# Patient Record
Sex: FEMALE | Race: WHITE | NOT HISPANIC OR LATINO | URBAN - METROPOLITAN AREA
[De-identification: names, ages, dates, MRNs, and addresses within clinical notes are randomized per-mention and may not be internally consistent; named-entity substitution may affect disease eponyms.]

---

## 2017-03-03 ENCOUNTER — EMERGENCY (EMERGENCY)
Facility: HOSPITAL | Age: 67
LOS: 1 days | Discharge: HOME | End: 2017-03-03
Attending: EMERGENCY MEDICINE | Admitting: EMERGENCY MEDICINE
Payer: MEDICARE

## 2017-03-03 VITALS
TEMPERATURE: 98 F | DIASTOLIC BLOOD PRESSURE: 82 MMHG | WEIGHT: 125 LBS | SYSTOLIC BLOOD PRESSURE: 131 MMHG | OXYGEN SATURATION: 98 % | HEART RATE: 100 BPM | RESPIRATION RATE: 14 BRPM | HEIGHT: 59 IN

## 2017-03-03 DIAGNOSIS — Z90.710 ACQUIRED ABSENCE OF BOTH CERVIX AND UTERUS: Chronic | ICD-10-CM

## 2017-03-03 DIAGNOSIS — M25.561 PAIN IN RIGHT KNEE: ICD-10-CM
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
## 2017-03-03 DIAGNOSIS — S82.001A UNSPECIFIED FRACTURE OF RIGHT PATELLA, INITIAL ENCOUNTER FOR CLOSED FRACTURE: ICD-10-CM

## 2017-03-03 PROCEDURE — 73564 X-RAY EXAM KNEE 4 OR MORE: CPT

## 2017-03-03 PROCEDURE — 29505 APPLICATION LONG LEG SPLINT: CPT

## 2017-03-03 PROCEDURE — 99283 EMERGENCY DEPT VISIT LOW MDM: CPT | Mod: 25

## 2017-03-03 PROCEDURE — 29505 APPLICATION LONG LEG SPLINT: CPT | Mod: RT

## 2017-03-03 PROCEDURE — 73564 X-RAY EXAM KNEE 4 OR MORE: CPT | Mod: 26,RT

## 2017-03-03 NOTE — ED PROCEDURE NOTE - NS ED PERI VASCULAR NEG
no swelling/no paresthesia/fingers/toes warm to touch/capillary refill time < 2 seconds/no cyanosis of extremity

## 2017-03-03 NOTE — ED ADULT NURSE NOTE - OBJECTIVE STATEMENT
Pt alert and oriented, came to ED s/p fall, reports injuring Right knee, swelling noted, pain on weight bearing, advised on plan of care, verbalized understanding, awaiting dispo.

## 2017-03-03 NOTE — ED PROVIDER NOTE - MUSCULOSKELETAL, MLM
Spine appears normal, range of motion is limited at the r knee due to guarding at the knee. there is pain and swelling about the r knee. no pain at hip, no shortening of leg no rotational abnomrlitie

## 2017-03-03 NOTE — ED PROCEDURE NOTE - CPROC ED POST PROC CARE GUIDE1
Elevate the injured extremity as instructed./Verbal/written post procedure instructions were given to patient/caregiver./Instructed patient/caregiver regarding signs and symptoms of infection./Instructed patient/caregiver to follow-up with primary care physician.

## 2020-05-29 ENCOUNTER — TRANSCRIPTION ENCOUNTER (OUTPATIENT)
Age: 70
End: 2020-05-29

## 2020-05-29 ENCOUNTER — APPOINTMENT (OUTPATIENT)
Dept: INTERNAL MEDICINE | Facility: CLINIC | Age: 70
End: 2020-05-29
Payer: MEDICARE

## 2020-05-29 VITALS
DIASTOLIC BLOOD PRESSURE: 70 MMHG | HEIGHT: 60 IN | BODY MASS INDEX: 27.48 KG/M2 | WEIGHT: 140 LBS | SYSTOLIC BLOOD PRESSURE: 122 MMHG | HEART RATE: 94 BPM | TEMPERATURE: 98.1 F | RESPIRATION RATE: 14 BRPM | OXYGEN SATURATION: 99 %

## 2020-05-29 DIAGNOSIS — Z78.9 OTHER SPECIFIED HEALTH STATUS: ICD-10-CM

## 2020-05-29 PROCEDURE — G0439: CPT

## 2020-05-29 PROCEDURE — 90670 PCV13 VACCINE IM: CPT

## 2020-05-29 PROCEDURE — G0009: CPT

## 2020-05-29 NOTE — HEALTH RISK ASSESSMENT
[Good] : ~his/her~ current health as good [] : No [No] : No [Patient reported mammogram was normal] : Patient reported mammogram was normal [Patient reported colonoscopy was normal] : Patient reported colonoscopy was normal [MammogramDate] : 12/15 [ColonoscopyDate] : 06/13

## 2020-05-29 NOTE — PHYSICAL EXAM
[No Acute Distress] : no acute distress [Well Nourished] : well nourished [Well Developed] : well developed [Well-Appearing] : well-appearing [Normal Sclera/Conjunctiva] : normal sclera/conjunctiva [PERRL] : pupils equal round and reactive to light [EOMI] : extraocular movements intact [Normal Outer Ear/Nose] : the outer ears and nose were normal in appearance [Normal Oropharynx] : the oropharynx was normal [No JVD] : no jugular venous distention [No Lymphadenopathy] : no lymphadenopathy [Supple] : supple [Thyroid Normal, No Nodules] : the thyroid was normal and there were no nodules present [No Respiratory Distress] : no respiratory distress  [No Accessory Muscle Use] : no accessory muscle use [Clear to Auscultation] : lungs were clear to auscultation bilaterally [Normal Rate] : normal rate  [Regular Rhythm] : with a regular rhythm [Normal S1, S2] : normal S1 and S2 [No Murmur] : no murmur heard [No Carotid Bruits] : no carotid bruits [No Abdominal Bruit] : a ~M bruit was not heard ~T in the abdomen [Pedal Pulses Present] : the pedal pulses are present [No Varicosities] : no varicosities [No Palpable Aorta] : no palpable aorta [No Edema] : there was no peripheral edema [No Extremity Clubbing/Cyanosis] : no extremity clubbing/cyanosis [Non Tender] : non-tender [Soft] : abdomen soft [No Masses] : no abdominal mass palpated [Non-distended] : non-distended [Normal Bowel Sounds] : normal bowel sounds [No HSM] : no HSM [Normal Posterior Cervical Nodes] : no posterior cervical lymphadenopathy [Normal Anterior Cervical Nodes] : no anterior cervical lymphadenopathy [No Spinal Tenderness] : no spinal tenderness [No CVA Tenderness] : no CVA  tenderness [No Joint Swelling] : no joint swelling [Grossly Normal Strength/Tone] : grossly normal strength/tone [No Rash] : no rash [No Focal Deficits] : no focal deficits [Coordination Grossly Intact] : coordination grossly intact [Deep Tendon Reflexes (DTR)] : deep tendon reflexes were 2+ and symmetric [Normal Gait] : normal gait [Normal Affect] : the affect was normal [Normal Insight/Judgement] : insight and judgment were intact

## 2020-06-01 LAB
25(OH)D3 SERPL-MCNC: 47.2 NG/ML
ALBUMIN SERPL ELPH-MCNC: 4.7 G/DL
ALP BLD-CCNC: 87 U/L
ALT SERPL-CCNC: 35 U/L
ANION GAP SERPL CALC-SCNC: 13 MMOL/L
APPEARANCE: CLEAR
AST SERPL-CCNC: 28 U/L
BACTERIA: NEGATIVE
BASOPHILS # BLD AUTO: 0.05 K/UL
BASOPHILS NFR BLD AUTO: 0.5 %
BILIRUB SERPL-MCNC: 0.6 MG/DL
BILIRUBIN URINE: NEGATIVE
BLOOD URINE: NEGATIVE
BUN SERPL-MCNC: 13 MG/DL
CALCIUM SERPL-MCNC: 9.9 MG/DL
CHLORIDE SERPL-SCNC: 101 MMOL/L
CHOLEST SERPL-MCNC: 228 MG/DL
CHOLEST/HDLC SERPL: 2.6 RATIO
CO2 SERPL-SCNC: 26 MMOL/L
COLOR: NORMAL
CREAT SERPL-MCNC: 0.74 MG/DL
EOSINOPHIL # BLD AUTO: 0.15 K/UL
EOSINOPHIL NFR BLD AUTO: 1.6 %
ESTIMATED AVERAGE GLUCOSE: 103 MG/DL
FOLATE SERPL-MCNC: >20 NG/ML
GLUCOSE QUALITATIVE U: NEGATIVE
GLUCOSE SERPL-MCNC: 98 MG/DL
HBA1C MFR BLD HPLC: 5.2 %
HCT VFR BLD CALC: 39 %
HDLC SERPL-MCNC: 88 MG/DL
HGB BLD-MCNC: 12.3 G/DL
HYALINE CASTS: 1 /LPF
IMM GRANULOCYTES NFR BLD AUTO: 0.5 %
KETONES URINE: NEGATIVE
LDLC SERPL CALC-MCNC: 126 MG/DL
LEUKOCYTE ESTERASE URINE: ABNORMAL
LYMPHOCYTES # BLD AUTO: 1.88 K/UL
LYMPHOCYTES NFR BLD AUTO: 20.3 %
MAN DIFF?: NORMAL
MCHC RBC-ENTMCNC: 31.5 GM/DL
MCHC RBC-ENTMCNC: 31.7 PG
MCV RBC AUTO: 100.5 FL
MICROSCOPIC-UA: NORMAL
MONOCYTES # BLD AUTO: 0.85 K/UL
MONOCYTES NFR BLD AUTO: 9.2 %
NEUTROPHILS # BLD AUTO: 6.3 K/UL
NEUTROPHILS NFR BLD AUTO: 67.9 %
NITRITE URINE: NEGATIVE
PH URINE: 5
PLATELET # BLD AUTO: 347 K/UL
POTASSIUM SERPL-SCNC: 4.9 MMOL/L
PROT SERPL-MCNC: 7.1 G/DL
PROTEIN URINE: NEGATIVE
RBC # BLD: 3.88 M/UL
RBC # FLD: 13.2 %
RED BLOOD CELLS URINE: 1 /HPF
SODIUM SERPL-SCNC: 140 MMOL/L
SPECIFIC GRAVITY URINE: 1.02
SQUAMOUS EPITHELIAL CELLS: 2 /HPF
TRIGL SERPL-MCNC: 71 MG/DL
TSH SERPL-ACNC: 4.68 UIU/ML
UROBILINOGEN URINE: NORMAL
VIT B12 SERPL-MCNC: 484 PG/ML
WBC # FLD AUTO: 9.28 K/UL
WHITE BLOOD CELLS URINE: 17 /HPF

## 2020-09-08 ENCOUNTER — APPOINTMENT (OUTPATIENT)
Dept: INTERNAL MEDICINE | Facility: CLINIC | Age: 70
End: 2020-09-08

## 2020-09-23 ENCOUNTER — LABORATORY RESULT (OUTPATIENT)
Age: 70
End: 2020-09-23

## 2020-09-23 DIAGNOSIS — Z11.59 ENCOUNTER FOR SCREENING FOR OTHER VIRAL DISEASES: ICD-10-CM

## 2020-09-23 LAB
T3RU NFR SERPL: 1.1 TBI
T4 FREE SERPL-MCNC: 1.3 NG/DL
TSH SERPL-ACNC: 3.02 UIU/ML

## 2020-09-24 LAB
THYROGLOB AB SERPL-ACNC: <20 IU/ML
THYROPEROXIDASE AB SERPL IA-ACNC: <10 IU/ML

## 2020-10-01 LAB — SARS-COV-2 N GENE NPH QL NAA+PROBE: NOT DETECTED

## 2020-11-04 ENCOUNTER — RESULT REVIEW (OUTPATIENT)
Age: 70
End: 2020-11-04

## 2020-11-04 ENCOUNTER — APPOINTMENT (OUTPATIENT)
Dept: ULTRASOUND IMAGING | Facility: CLINIC | Age: 70
End: 2020-11-04
Payer: MEDICARE

## 2020-11-04 ENCOUNTER — APPOINTMENT (OUTPATIENT)
Dept: MAMMOGRAPHY | Facility: CLINIC | Age: 70
End: 2020-11-04
Payer: MEDICARE

## 2020-11-04 ENCOUNTER — APPOINTMENT (OUTPATIENT)
Dept: RADIOLOGY | Facility: CLINIC | Age: 70
End: 2020-11-04
Payer: MEDICARE

## 2020-11-04 ENCOUNTER — OUTPATIENT (OUTPATIENT)
Dept: OUTPATIENT SERVICES | Facility: HOSPITAL | Age: 70
LOS: 1 days | End: 2020-11-04
Payer: MEDICARE

## 2020-11-04 DIAGNOSIS — Z90.710 ACQUIRED ABSENCE OF BOTH CERVIX AND UTERUS: Chronic | ICD-10-CM

## 2020-11-04 DIAGNOSIS — Z00.8 ENCOUNTER FOR OTHER GENERAL EXAMINATION: ICD-10-CM

## 2020-11-04 PROCEDURE — 77067 SCR MAMMO BI INCL CAD: CPT | Mod: 26

## 2020-11-04 PROCEDURE — 77080 DXA BONE DENSITY AXIAL: CPT | Mod: 26

## 2020-11-04 PROCEDURE — 77063 BREAST TOMOSYNTHESIS BI: CPT | Mod: 26

## 2020-11-04 PROCEDURE — 76641 ULTRASOUND BREAST COMPLETE: CPT

## 2020-11-04 PROCEDURE — 76641 ULTRASOUND BREAST COMPLETE: CPT | Mod: 26,50

## 2020-11-04 PROCEDURE — 77063 BREAST TOMOSYNTHESIS BI: CPT

## 2020-11-04 PROCEDURE — 77067 SCR MAMMO BI INCL CAD: CPT

## 2020-11-04 PROCEDURE — 77080 DXA BONE DENSITY AXIAL: CPT

## 2020-11-10 ENCOUNTER — NON-APPOINTMENT (OUTPATIENT)
Age: 70
End: 2020-11-10

## 2020-12-14 ENCOUNTER — APPOINTMENT (OUTPATIENT)
Dept: ENDOCRINOLOGY | Facility: CLINIC | Age: 70
End: 2020-12-14
Payer: MEDICARE

## 2020-12-14 VITALS
BODY MASS INDEX: 27.48 KG/M2 | SYSTOLIC BLOOD PRESSURE: 133 MMHG | HEIGHT: 60 IN | DIASTOLIC BLOOD PRESSURE: 87 MMHG | WEIGHT: 140 LBS | OXYGEN SATURATION: 99 % | RESPIRATION RATE: 14 BRPM | HEART RATE: 89 BPM

## 2020-12-14 PROCEDURE — 99072 ADDL SUPL MATRL&STAF TM PHE: CPT

## 2020-12-14 PROCEDURE — 36415 COLL VENOUS BLD VENIPUNCTURE: CPT

## 2020-12-14 PROCEDURE — 99204 OFFICE O/P NEW MOD 45 MIN: CPT | Mod: 25

## 2020-12-15 NOTE — CONSULT LETTER
[Dear  ___] : Dear  [unfilled], [Consult Letter:] : I had the pleasure of evaluating your patient, [unfilled]. [Please see my note below.] : Please see my note below. [Consult Closing:] : Thank you very much for allowing me to participate in the care of this patient.  If you have any questions, please do not hesitate to contact me. [Sincerely,] : Sincerely, [FreeTextEntry3] : Sari Boss MS. DO.\par Endocrinology, Diabetes and Metabolism\par [address] [place] [address]\[place] [address]\par Tel [phone]\par Fax [phone]\par

## 2020-12-15 NOTE — ASSESSMENT
[FreeTextEntry1] : 70 year old female with past medical history of Osteoporosis, Hypothyroidism who presents for management of her osteoporosis\par \par 1.Osteoporosis\par Patient warrants treatment for her OP given that she is at high risk for fractures. \par Diet, weight bearing and muscle strengthening exercise and fall prevention were discussed. Smoking cessation and alcohol consumption (no more then an average 2 drinks/day) was discussed. \par I counseled the patient regarding calcium and vitamin D intake. Calcium 1200 mg daily from diet and supplements (to be taken in divided doses as no more than 500-600 mg can be absorbed at one time)\par Metabolic evaluation for secondary causes of osteoporosis\par We discussed the potential benefits and risks of the osteoanabolic and antiresorptive classes of pharmacologic osteoporosis therapy. If history of radiation therapy, teriparatide and abaloparatide are contraindicated, however, we can consider romosozumab therapy. We discussed the potential benefits and risks of romosozumab at length, including but not limited to osteonecrosis of the jaw, atypical femoral fracture, cardiovascular risk including heart attack and stroke. We also discussed the potential benefits and risks of antiresorptive osteoporosis therapy with denosumab or zoledronic acid at length, including but not limited to osteonecrosis of the jaw and atypical femoral fracture. We discussed that denosumab must be dosed every 6 months due to rebound increase in bone breakdown with abrupt discontinuation of therapy, with transition to bisphosphonate therapy prior to a "drug holiday." Raloxifene is a weak pharmacologic agent for osteoporosis, with some vertebral fracture efficacy but no efficacy for nonvertebral fractures.\par \par Decision was made to start Prolia\par Follow up for injection \par

## 2020-12-15 NOTE — HISTORY OF PRESENT ILLNESS
[FreeTextEntry1] : Ms. MAEVE ELIZONDO  is a 70 year old female with past medical history of Osteoporosis, Hypothyroidism who presents for management of her osteoporosis.\par \par Bone History:\par Menopause: Last menstrual period 1999\par Osteoporosis diagnosed in 2015\par Fracture history: Patellar fracture\par Family history: No parental history of osteoporosis\par Treatment: Fosamax ([id number])\par Falls: Yes\par Height loss: No\par Kidney stones: No\par Dental health: Regular appointments, many implants all the time \par Exercise: walk\par Dairy intake: None\par Calcium supplements: 600 mg \par Multivitamin: None\par Vitamin D supplements: Vitamin D3 5,000 IU -20 mcg \par \par Osteoporosis risk factors include: Postmenopausal status,  race, prior fracture, falls, \par

## 2021-01-23 ENCOUNTER — TRANSCRIPTION ENCOUNTER (OUTPATIENT)
Age: 71
End: 2021-01-23

## 2021-01-26 ENCOUNTER — APPOINTMENT (OUTPATIENT)
Dept: ENDOCRINOLOGY | Facility: CLINIC | Age: 71
End: 2021-01-26
Payer: MEDICARE

## 2021-01-26 LAB
25(OH)D3 SERPL-MCNC: 64.9 NG/ML
COLLAGEN NTX UR-SCNC: 36
CREAT UR-MCNC: 50 MG/DL

## 2021-01-26 PROCEDURE — 99072 ADDL SUPL MATRL&STAF TM PHE: CPT

## 2021-01-26 PROCEDURE — 96372 THER/PROPH/DIAG INJ SC/IM: CPT

## 2021-01-26 PROCEDURE — 99213 OFFICE O/P EST LOW 20 MIN: CPT | Mod: 25

## 2021-01-26 RX ORDER — DENOSUMAB 60 MG/ML
60 INJECTION SUBCUTANEOUS
Qty: 60 | Refills: 0 | Status: COMPLETED | OUTPATIENT
Start: 2021-01-26

## 2021-01-26 RX ADMIN — DENOSUMAB 0 MG/ML: 60 INJECTION SUBCUTANEOUS at 00:00

## 2021-01-27 NOTE — PHYSICAL EXAM
[Alert] : alert [Well Nourished] : well nourished [Healthy Appearance] : healthy appearance [No Acute Distress] : no acute distress [Normal Sclera/Conjunctiva] : normal sclera/conjunctiva [Normal Outer Ear/Nose] : the ears and nose were normal in appearance [Normal Hearing] : hearing was normal [No Neck Mass] : no neck mass was observed [No Respiratory Distress] : no respiratory distress [No Rash] : no rash [Oriented x3] : oriented to person, place, and time

## 2021-01-27 NOTE — ASSESSMENT
[FreeTextEntry1] : 70 year old female with past medical history of Osteoporosis, Hypothyroidism who presents for management of her osteoporosis\par \par 1.Osteoporosis\par Patient warrants treatment for her OP given that she is at high risk for fractures. \par Diet, weight bearing and muscle strengthening exercise and fall prevention were discussed. Smoking cessation and alcohol consumption (no more then an average 2 drinks/day) was discussed. \par I counseled the patient regarding calcium and vitamin D intake. Calcium 1200 mg daily from diet and supplements (to be taken in divided doses as no more than 500-600 mg can be absorbed at one time)\par \par Patient tolerated Prolia well. Injected in right upper arm.\par Follow up in 6 months\par DEXA in 12/2022\par

## 2021-01-27 NOTE — HISTORY OF PRESENT ILLNESS
[FreeTextEntry1] : Ms. MAEVE ELIZONDO  is a 70 year old female with past medical history of Osteoporosis, Hypothyroidism who presents for management of her osteoporosis. Patient feels well and denies any complaints. Here for Prolia.\par \par

## 2021-02-20 ENCOUNTER — TRANSCRIPTION ENCOUNTER (OUTPATIENT)
Age: 71
End: 2021-02-20

## 2021-06-03 ENCOUNTER — NON-APPOINTMENT (OUTPATIENT)
Age: 71
End: 2021-06-03

## 2021-06-03 ENCOUNTER — APPOINTMENT (OUTPATIENT)
Dept: INTERNAL MEDICINE | Facility: CLINIC | Age: 71
End: 2021-06-03
Payer: MEDICARE

## 2021-06-03 VITALS
DIASTOLIC BLOOD PRESSURE: 74 MMHG | HEIGHT: 60 IN | BODY MASS INDEX: 27.09 KG/M2 | SYSTOLIC BLOOD PRESSURE: 132 MMHG | RESPIRATION RATE: 14 BRPM | HEART RATE: 86 BPM | OXYGEN SATURATION: 97 % | TEMPERATURE: 97.9 F | WEIGHT: 138 LBS

## 2021-06-03 VITALS — WEIGHT: 126 LBS | BODY MASS INDEX: 24.61 KG/M2

## 2021-06-03 DIAGNOSIS — Z23 ENCOUNTER FOR IMMUNIZATION: ICD-10-CM

## 2021-06-03 PROCEDURE — G0439: CPT

## 2021-06-03 PROCEDURE — G0009: CPT

## 2021-06-03 PROCEDURE — 99072 ADDL SUPL MATRL&STAF TM PHE: CPT

## 2021-06-03 PROCEDURE — 93000 ELECTROCARDIOGRAM COMPLETE: CPT

## 2021-06-03 PROCEDURE — 90732 PPSV23 VACC 2 YRS+ SUBQ/IM: CPT

## 2021-06-03 NOTE — ASSESSMENT
[FreeTextEntry1] : HCM- Needs COLON. Needs skin check and eye exam\par Check labs and EKG\par PNA shot today

## 2021-06-03 NOTE — HEALTH RISK ASSESSMENT
[Good] : ~his/her~  mood as  good [No falls in past year] : Patient reported no falls in the past year [Patient reported mammogram was normal] : Patient reported mammogram was normal [Patient reported colonoscopy was normal] : Patient reported colonoscopy was normal [MammogramDate] : 11/20 [BoneDensityDate] : 11/20 [BoneDensityComments] : osteoporosis [ColonoscopyDate] : 06/13

## 2021-06-04 LAB
25(OH)D3 SERPL-MCNC: 57.9 NG/ML
ALBUMIN SERPL ELPH-MCNC: 4.8 G/DL
ALP BLD-CCNC: 80 U/L
ALT SERPL-CCNC: 12 U/L
ANION GAP SERPL CALC-SCNC: 13 MMOL/L
APPEARANCE: CLEAR
AST SERPL-CCNC: 19 U/L
BACTERIA: NEGATIVE
BASOPHILS # BLD AUTO: 0.05 K/UL
BASOPHILS NFR BLD AUTO: 0.5 %
BILIRUB SERPL-MCNC: 0.5 MG/DL
BILIRUBIN URINE: NEGATIVE
BLOOD URINE: NEGATIVE
BUN SERPL-MCNC: 13 MG/DL
CALCIUM SERPL-MCNC: 10.3 MG/DL
CHLORIDE SERPL-SCNC: 100 MMOL/L
CHOLEST SERPL-MCNC: 266 MG/DL
CO2 SERPL-SCNC: 26 MMOL/L
COLOR: NORMAL
CREAT SERPL-MCNC: 0.76 MG/DL
EOSINOPHIL # BLD AUTO: 0.18 K/UL
EOSINOPHIL NFR BLD AUTO: 1.9 %
ESTIMATED AVERAGE GLUCOSE: 100 MG/DL
FOLATE SERPL-MCNC: >20 NG/ML
GLUCOSE QUALITATIVE U: NEGATIVE
GLUCOSE SERPL-MCNC: 93 MG/DL
HBA1C MFR BLD HPLC: 5.1 %
HCT VFR BLD CALC: 40.6 %
HDLC SERPL-MCNC: 105 MG/DL
HGB BLD-MCNC: 13.3 G/DL
HYALINE CASTS: 0 /LPF
IMM GRANULOCYTES NFR BLD AUTO: 0.2 %
KETONES URINE: NEGATIVE
LDLC SERPL CALC-MCNC: 147 MG/DL
LEUKOCYTE ESTERASE URINE: NEGATIVE
LYMPHOCYTES # BLD AUTO: 2.34 K/UL
LYMPHOCYTES NFR BLD AUTO: 24.6 %
MAN DIFF?: NORMAL
MCHC RBC-ENTMCNC: 32.4 PG
MCHC RBC-ENTMCNC: 32.8 GM/DL
MCV RBC AUTO: 98.8 FL
MICROSCOPIC-UA: NORMAL
MONOCYTES # BLD AUTO: 0.75 K/UL
MONOCYTES NFR BLD AUTO: 7.9 %
NEUTROPHILS # BLD AUTO: 6.18 K/UL
NEUTROPHILS NFR BLD AUTO: 64.9 %
NITRITE URINE: NEGATIVE
NONHDLC SERPL-MCNC: 161 MG/DL
PH URINE: 5.5
PLATELET # BLD AUTO: 351 K/UL
POTASSIUM SERPL-SCNC: 5.3 MMOL/L
PROT SERPL-MCNC: 7.7 G/DL
PROTEIN URINE: NEGATIVE
RBC # BLD: 4.11 M/UL
RBC # FLD: 13.2 %
RED BLOOD CELLS URINE: 1 /HPF
SODIUM SERPL-SCNC: 140 MMOL/L
SPECIFIC GRAVITY URINE: 1.01
SQUAMOUS EPITHELIAL CELLS: 0 /HPF
TRIGL SERPL-MCNC: 72 MG/DL
TSH SERPL-ACNC: 3.23 UIU/ML
UROBILINOGEN URINE: NORMAL
VIT B12 SERPL-MCNC: 623 PG/ML
WBC # FLD AUTO: 9.52 K/UL
WHITE BLOOD CELLS URINE: 0 /HPF

## 2021-06-09 LAB — HEMOCCULT STL QL IA: NEGATIVE

## 2021-07-13 ENCOUNTER — APPOINTMENT (OUTPATIENT)
Dept: ORTHOPEDIC SURGERY | Facility: CLINIC | Age: 71
End: 2021-07-13
Payer: MEDICARE

## 2021-07-13 VITALS
HEIGHT: 60 IN | OXYGEN SATURATION: 97 % | DIASTOLIC BLOOD PRESSURE: 77 MMHG | BODY MASS INDEX: 24.74 KG/M2 | SYSTOLIC BLOOD PRESSURE: 150 MMHG | HEART RATE: 92 BPM | WEIGHT: 126 LBS

## 2021-07-13 PROCEDURE — 99204 OFFICE O/P NEW MOD 45 MIN: CPT

## 2021-07-13 PROCEDURE — 99072 ADDL SUPL MATRL&STAF TM PHE: CPT

## 2021-07-15 ENCOUNTER — TRANSCRIPTION ENCOUNTER (OUTPATIENT)
Age: 71
End: 2021-07-15

## 2021-07-15 ENCOUNTER — OUTPATIENT (OUTPATIENT)
Dept: OUTPATIENT SERVICES | Facility: HOSPITAL | Age: 71
LOS: 1 days | End: 2021-07-15
Payer: MEDICARE

## 2021-07-15 VITALS
DIASTOLIC BLOOD PRESSURE: 76 MMHG | TEMPERATURE: 98 F | OXYGEN SATURATION: 98 % | WEIGHT: 123.02 LBS | RESPIRATION RATE: 16 BRPM | SYSTOLIC BLOOD PRESSURE: 112 MMHG | HEART RATE: 88 BPM | HEIGHT: 60 IN

## 2021-07-15 DIAGNOSIS — Z90.710 ACQUIRED ABSENCE OF BOTH CERVIX AND UTERUS: Chronic | ICD-10-CM

## 2021-07-15 DIAGNOSIS — M67.432 GANGLION, LEFT WRIST: ICD-10-CM

## 2021-07-15 DIAGNOSIS — Z98.891 HISTORY OF UTERINE SCAR FROM PREVIOUS SURGERY: Chronic | ICD-10-CM

## 2021-07-15 DIAGNOSIS — Z98.890 OTHER SPECIFIED POSTPROCEDURAL STATES: Chronic | ICD-10-CM

## 2021-07-15 DIAGNOSIS — Z01.818 ENCOUNTER FOR OTHER PREPROCEDURAL EXAMINATION: ICD-10-CM

## 2021-07-15 PROCEDURE — G0463: CPT

## 2021-07-15 RX ORDER — CHLORHEXIDINE GLUCONATE 213 G/1000ML
1 SOLUTION TOPICAL ONCE
Refills: 0 | Status: DISCONTINUED | OUTPATIENT
Start: 2021-07-16 | End: 2021-07-31

## 2021-07-15 RX ORDER — SODIUM CHLORIDE 9 MG/ML
3 INJECTION INTRAMUSCULAR; INTRAVENOUS; SUBCUTANEOUS EVERY 8 HOURS
Refills: 0 | Status: DISCONTINUED | OUTPATIENT
Start: 2021-07-16 | End: 2021-07-31

## 2021-07-15 RX ORDER — LIDOCAINE HCL 20 MG/ML
0.2 VIAL (ML) INJECTION ONCE
Refills: 0 | Status: DISCONTINUED | OUTPATIENT
Start: 2021-07-16 | End: 2021-07-31

## 2021-07-15 NOTE — H&P PST ADULT - HISTORY OF PRESENT ILLNESS
71 Y F with h/o osteoporosis c/o increase in size of  left wrist ganglion cyst x one year. Pt had surgical consult- scheduled for left volar ganglion excision on 7/16/21  Denies any fever, chills, recent travel, or Covid related infections  **received 2 doses Covid vaccine (in HIE)

## 2021-07-15 NOTE — ASU DISCHARGE PLAN (ADULT/PEDIATRIC) - CARE PROVIDER_API CALL
Joy Walters (MD; MPH)  Orthopaedic Surgery  [address] [facility], [address]  [place] [address]  Phone: [phone]  Fax: [phone]  Follow Up Time:

## 2021-07-15 NOTE — H&P PST ADULT - NSICDXPROBLEM_GEN_ALL_CORE_FT
PROBLEM DIAGNOSES  Problem: Ganglion cyst of wrist, left  Assessment and Plan: Left volar ganglion excision  Labs- CBC,, BMP  Pre op instructions discussed       PROBLEM DIAGNOSES  Problem: Ganglion cyst of wrist, left  Assessment and Plan: Left volar ganglion excision  Labs- CBC, BMP with PMD(in file)  Pre op instructions discussed

## 2021-07-16 ENCOUNTER — RESULT REVIEW (OUTPATIENT)
Age: 71
End: 2021-07-16

## 2021-07-16 ENCOUNTER — OUTPATIENT (OUTPATIENT)
Dept: OUTPATIENT SERVICES | Facility: HOSPITAL | Age: 71
LOS: 1 days | End: 2021-07-16
Payer: MEDICARE

## 2021-07-16 ENCOUNTER — APPOINTMENT (OUTPATIENT)
Dept: ORTHOPEDIC SURGERY | Facility: HOSPITAL | Age: 71
End: 2021-07-16

## 2021-07-16 VITALS
HEART RATE: 102 BPM | TEMPERATURE: 98 F | OXYGEN SATURATION: 99 % | DIASTOLIC BLOOD PRESSURE: 58 MMHG | RESPIRATION RATE: 15 BRPM | SYSTOLIC BLOOD PRESSURE: 124 MMHG

## 2021-07-16 VITALS
OXYGEN SATURATION: 97 % | DIASTOLIC BLOOD PRESSURE: 67 MMHG | SYSTOLIC BLOOD PRESSURE: 127 MMHG | TEMPERATURE: 98 F | HEART RATE: 97 BPM | WEIGHT: 123.02 LBS | RESPIRATION RATE: 14 BRPM | HEIGHT: 60 IN

## 2021-07-16 DIAGNOSIS — Z90.710 ACQUIRED ABSENCE OF BOTH CERVIX AND UTERUS: Chronic | ICD-10-CM

## 2021-07-16 DIAGNOSIS — Z98.890 OTHER SPECIFIED POSTPROCEDURAL STATES: Chronic | ICD-10-CM

## 2021-07-16 DIAGNOSIS — M67.432 GANGLION, LEFT WRIST: ICD-10-CM

## 2021-07-16 DIAGNOSIS — Z98.891 HISTORY OF UTERINE SCAR FROM PREVIOUS SURGERY: Chronic | ICD-10-CM

## 2021-07-16 PROCEDURE — 88304 TISSUE EXAM BY PATHOLOGIST: CPT

## 2021-07-16 PROCEDURE — 25111 REMOVE WRIST TENDON LESION: CPT | Mod: LT

## 2021-07-16 PROCEDURE — 88304 TISSUE EXAM BY PATHOLOGIST: CPT | Mod: 26

## 2021-07-16 RX ORDER — OXYCODONE HYDROCHLORIDE 5 MG/1
5 TABLET ORAL ONCE
Refills: 0 | Status: DISCONTINUED | OUTPATIENT
Start: 2021-07-16 | End: 2021-07-16

## 2021-07-16 RX ORDER — SODIUM CHLORIDE 9 MG/ML
1000 INJECTION, SOLUTION INTRAVENOUS
Refills: 0 | Status: DISCONTINUED | OUTPATIENT
Start: 2021-07-16 | End: 2021-07-16

## 2021-07-16 RX ORDER — SODIUM CHLORIDE 9 MG/ML
1000 INJECTION, SOLUTION INTRAVENOUS
Refills: 0 | Status: DISCONTINUED | OUTPATIENT
Start: 2021-07-16 | End: 2021-07-31

## 2021-07-16 RX ORDER — FENTANYL CITRATE 50 UG/ML
25 INJECTION INTRAVENOUS
Refills: 0 | Status: DISCONTINUED | OUTPATIENT
Start: 2021-07-16 | End: 2021-07-16

## 2021-07-16 RX ORDER — ONDANSETRON 8 MG/1
4 TABLET, FILM COATED ORAL ONCE
Refills: 0 | Status: DISCONTINUED | OUTPATIENT
Start: 2021-07-16 | End: 2021-07-31

## 2021-07-16 NOTE — BRIEF OPERATIVE NOTE - NSICDXBRIEFPREOP_GEN_ALL_CORE_FT
PRE-OP DIAGNOSIS:  Ganglion cyst of volar aspect of left wrist 16-Jul-2021 15:39:00  Jarek Siddiqi

## 2021-07-16 NOTE — ASU PATIENT PROFILE, ADULT - TEACHING/LEARNING RELIGIOUS CONSIDERATIONS
----- Message from Sabine Gregg NP sent at 5/11/2020  2:17 PM CDT -----  Advise patient that GC and chlamydia were negative. Follow up with PCP if symptoms are not improving.   none

## 2021-07-16 NOTE — BRIEF OPERATIVE NOTE - NSICDXBRIEFPOSTOP_GEN_ALL_CORE_FT
POST-OP DIAGNOSIS:  Ganglion cyst of volar aspect of left wrist 16-Jul-2021 15:39:13  Jarek Siddiqi

## 2021-07-16 NOTE — ASU PREOP CHECKLIST - PATIENT'S PERSONAL PROPERTY REMOVED
December 8, 2020        Hattie Trujillo   [facility] [address]  [place] [id number]    To Whom It May Concern:    This is to certify Hattie Trujillo was evaluated on 12/08/20 and is unable to return to work.    Hattie Trujillo should self-isolate.  ?  CDC guidelines for return to work are as follows:  · At least 24 hours have passed since fever resolution without use of fever reducing medication and   · Symptoms have improved and  · At least 10 days have passed since symptoms first appeared    **The loss of taste and smell may persist for weeks or months after recovery and do not need to delay the end of isolation.     Per CDC recommendations, employers should not require a COVID-19 test result or a healthcare provider’s note for employees who are sick to validate their illness, qualify for sick leave, or to return to work.    The Coronavirus is a rapidly evolving situation and recommendations are changing regularly to prevent spread of the disease and further loss of life.    Thank you for your understanding during these unusual times.     Electronically signed by:     Jayden Cox MD                 [id number] [place] [id number]     denies

## 2021-07-16 NOTE — ASU PATIENT PROFILE, ADULT - PMH
Cataract  2008, 2009  H/O: Hysterectomy  40 years ago  History of Tonsillectomy  childhood  S/P Laminectomy  Lumbar Laminectomy 2001 H/O ganglion cyst  left wrist  History of osteoporosis

## 2021-07-21 PROBLEM — Z87.39 PERSONAL HISTORY OF OTHER DISEASES OF THE MUSCULOSKELETAL SYSTEM AND CONNECTIVE TISSUE: Chronic | Status: ACTIVE | Noted: 2021-07-15

## 2021-07-28 ENCOUNTER — APPOINTMENT (OUTPATIENT)
Dept: ENDOCRINOLOGY | Facility: CLINIC | Age: 71
End: 2021-07-28
Payer: MEDICARE

## 2021-07-28 PROCEDURE — 96372 THER/PROPH/DIAG INJ SC/IM: CPT

## 2021-07-28 PROCEDURE — 99072 ADDL SUPL MATRL&STAF TM PHE: CPT

## 2021-07-28 RX ORDER — DENOSUMAB 60 MG/ML
60 INJECTION SUBCUTANEOUS
Qty: 1 | Refills: 0 | Status: COMPLETED | OUTPATIENT
Start: 2021-07-28

## 2021-07-28 RX ADMIN — DENOSUMAB 0 MG/ML: 60 INJECTION SUBCUTANEOUS at 00:00

## 2021-07-29 ENCOUNTER — APPOINTMENT (OUTPATIENT)
Dept: ORTHOPEDIC SURGERY | Facility: CLINIC | Age: 71
End: 2021-07-29
Payer: MEDICARE

## 2021-07-29 VITALS
HEIGHT: 60 IN | DIASTOLIC BLOOD PRESSURE: 74 MMHG | WEIGHT: 125 LBS | OXYGEN SATURATION: 97 % | SYSTOLIC BLOOD PRESSURE: 119 MMHG | HEART RATE: 69 BPM | BODY MASS INDEX: 24.54 KG/M2

## 2021-07-29 DIAGNOSIS — M67.432 GANGLION, LEFT WRIST: ICD-10-CM

## 2021-07-29 PROCEDURE — 99024 POSTOP FOLLOW-UP VISIT: CPT

## 2021-07-30 ENCOUNTER — APPOINTMENT (OUTPATIENT)
Dept: ORTHOPEDIC SURGERY | Facility: CLINIC | Age: 71
End: 2021-07-30

## 2021-08-16 LAB — SURGICAL PATHOLOGY STUDY: SIGNIFICANT CHANGE UP

## 2021-09-09 ENCOUNTER — APPOINTMENT (OUTPATIENT)
Dept: DERMATOLOGY | Facility: CLINIC | Age: 71
End: 2021-09-09
Payer: MEDICARE

## 2021-09-09 VITALS — WEIGHT: 126 LBS | BODY MASS INDEX: 24.74 KG/M2 | HEIGHT: 60 IN

## 2021-09-09 VITALS — WEIGHT: 126 LBS | HEIGHT: 60 IN | BODY MASS INDEX: 24.74 KG/M2

## 2021-09-09 DIAGNOSIS — L82.1 OTHER SEBORRHEIC KERATOSIS: ICD-10-CM

## 2021-09-09 DIAGNOSIS — D23.39 OTHER BENIGN NEOPLASM OF SKIN OF OTHER PARTS OF FACE: ICD-10-CM

## 2021-09-09 DIAGNOSIS — Z12.83 ENCOUNTER FOR SCREENING FOR MALIGNANT NEOPLASM OF SKIN: ICD-10-CM

## 2021-09-09 PROCEDURE — 99203 OFFICE O/P NEW LOW 30 MIN: CPT

## 2022-02-01 ENCOUNTER — APPOINTMENT (OUTPATIENT)
Dept: ENDOCRINOLOGY | Facility: CLINIC | Age: 72
End: 2022-02-01
Payer: MEDICARE

## 2022-02-01 VITALS
SYSTOLIC BLOOD PRESSURE: 120 MMHG | OXYGEN SATURATION: 100 % | HEART RATE: 70 BPM | DIASTOLIC BLOOD PRESSURE: 77 MMHG | BODY MASS INDEX: 25 KG/M2 | WEIGHT: 128 LBS

## 2022-02-01 DIAGNOSIS — E03.9 HYPOTHYROIDISM, UNSPECIFIED: ICD-10-CM

## 2022-02-01 PROCEDURE — 99213 OFFICE O/P EST LOW 20 MIN: CPT | Mod: 25

## 2022-02-01 PROCEDURE — 96372 THER/PROPH/DIAG INJ SC/IM: CPT

## 2022-02-01 RX ORDER — DENOSUMAB 60 MG/ML
60 INJECTION SUBCUTANEOUS
Qty: 1 | Refills: 0 | Status: COMPLETED | OUTPATIENT
Start: 2022-02-01

## 2022-02-01 RX ADMIN — DENOSUMAB 0 MG/ML: 60 INJECTION SUBCUTANEOUS at 00:00

## 2022-02-01 NOTE — PHYSICAL EXAM
[Alert] : alert [Well Nourished] : well nourished [Healthy Appearance] : healthy appearance [Obese] : obese [No Acute Distress] : no acute distress [Well Developed] : well developed [Normal Voice/Communication] : normal voice communication [No Rash] : no rash [Oriented x3] : oriented to person, place, and time

## 2022-02-03 NOTE — ASSESSMENT
[FreeTextEntry1] : 72 year old female with past medical history of Osteoporosis, Hypothyroidism who presents for management of her osteoporosis\par \par 1.Osteoporosis\par Patient warrants treatment for her OP given that she is at high risk for fractures. \par Diet, weight bearing and muscle strengthening exercise and fall prevention were discussed. Smoking cessation and alcohol consumption (no more then an average 2 drinks/day) was discussed. \par I counseled the patient regarding calcium and vitamin D intake. Calcium 1200 mg daily from diet and supplements (to be taken in divided doses as no more than 500-600 mg can be absorbed at one time)\par \par Due for injection today\par Follow up in 6 months\par DEXA in 12/2022\par

## 2022-02-03 NOTE — HISTORY OF PRESENT ILLNESS
[FreeTextEntry1] : Ms. MAEVE ELIZONDO  is a 72 year old female with past medical history of Osteoporosis, Hypothyroidism who presents for management of her osteoporosis. Patient feels well and denies any complaints. Here for Prolia.\par \par

## 2022-02-09 LAB
25(OH)D3 SERPL-MCNC: 36.3 NG/ML
ANION GAP SERPL CALC-SCNC: 15 MMOL/L
BUN SERPL-MCNC: 18 MG/DL
CALCIUM SERPL-MCNC: 10.6 MG/DL
CHLORIDE SERPL-SCNC: 102 MMOL/L
CO2 SERPL-SCNC: 24 MMOL/L
COLLAGEN CTX SERPL-MCNC: 150 PG/ML
COLLAGEN NTX UR-SCNC: 173 NMOL BCE
COLLAGEN NTX/CREAT UR-SRTO: 27
CREAT SERPL-MCNC: 0.85 MG/DL
CREAT UR-MCNC: 72.1 MG/DL
GLUCOSE SERPL-MCNC: 88 MG/DL
INTERPRETIVE GUIDE:: NORMAL
POTASSIUM SERPL-SCNC: 5.4 MMOL/L
SODIUM SERPL-SCNC: 141 MMOL/L

## 2022-02-11 ENCOUNTER — NON-APPOINTMENT (OUTPATIENT)
Age: 72
End: 2022-02-11

## 2022-03-16 ENCOUNTER — APPOINTMENT (OUTPATIENT)
Dept: MAMMOGRAPHY | Facility: CLINIC | Age: 72
End: 2022-03-16
Payer: MEDICARE

## 2022-03-16 ENCOUNTER — OUTPATIENT (OUTPATIENT)
Dept: OUTPATIENT SERVICES | Facility: HOSPITAL | Age: 72
LOS: 1 days | End: 2022-03-16
Payer: MEDICARE

## 2022-03-16 ENCOUNTER — RESULT REVIEW (OUTPATIENT)
Age: 72
End: 2022-03-16

## 2022-03-16 ENCOUNTER — APPOINTMENT (OUTPATIENT)
Dept: ULTRASOUND IMAGING | Facility: CLINIC | Age: 72
End: 2022-03-16
Payer: MEDICARE

## 2022-03-16 DIAGNOSIS — Z98.891 HISTORY OF UTERINE SCAR FROM PREVIOUS SURGERY: Chronic | ICD-10-CM

## 2022-03-16 DIAGNOSIS — Z00.8 ENCOUNTER FOR OTHER GENERAL EXAMINATION: ICD-10-CM

## 2022-03-16 DIAGNOSIS — Z98.890 OTHER SPECIFIED POSTPROCEDURAL STATES: Chronic | ICD-10-CM

## 2022-03-16 DIAGNOSIS — Z90.710 ACQUIRED ABSENCE OF BOTH CERVIX AND UTERUS: Chronic | ICD-10-CM

## 2022-03-16 PROCEDURE — 77063 BREAST TOMOSYNTHESIS BI: CPT | Mod: 26

## 2022-03-16 PROCEDURE — 76641 ULTRASOUND BREAST COMPLETE: CPT

## 2022-03-16 PROCEDURE — 76641 ULTRASOUND BREAST COMPLETE: CPT | Mod: 26,50

## 2022-03-16 PROCEDURE — 77067 SCR MAMMO BI INCL CAD: CPT

## 2022-03-16 PROCEDURE — 77067 SCR MAMMO BI INCL CAD: CPT | Mod: 26

## 2022-03-16 PROCEDURE — 77063 BREAST TOMOSYNTHESIS BI: CPT

## 2022-04-11 PROBLEM — Z11.59 SCREENING FOR VIRAL DISEASE: Status: ACTIVE | Noted: 2020-09-23

## 2022-06-08 ENCOUNTER — NON-APPOINTMENT (OUTPATIENT)
Age: 72
End: 2022-06-08

## 2022-06-08 ENCOUNTER — APPOINTMENT (OUTPATIENT)
Dept: INTERNAL MEDICINE | Facility: CLINIC | Age: 72
End: 2022-06-08
Payer: MEDICARE

## 2022-06-08 VITALS
SYSTOLIC BLOOD PRESSURE: 118 MMHG | HEIGHT: 60 IN | HEART RATE: 87 BPM | OXYGEN SATURATION: 98 % | WEIGHT: 131 LBS | DIASTOLIC BLOOD PRESSURE: 78 MMHG | BODY MASS INDEX: 25.72 KG/M2 | TEMPERATURE: 97.1 F | RESPIRATION RATE: 16 BRPM

## 2022-06-08 DIAGNOSIS — D22.9 MELANOCYTIC NEVI, UNSPECIFIED: ICD-10-CM

## 2022-06-08 PROCEDURE — 93000 ELECTROCARDIOGRAM COMPLETE: CPT

## 2022-06-08 PROCEDURE — G0439: CPT

## 2022-06-08 NOTE — HEALTH RISK ASSESSMENT
[Very Good] : ~his/her~  mood as very good [No falls in past year] : Patient reported no falls in the past year [0] : 2) Feeling down, depressed, or hopeless: Not at all (0) [Patient reported colonoscopy was normal] : Patient reported colonoscopy was normal [MammogramDate] : 03/22 [ColonoscopyDate] : 08/21

## 2022-06-09 LAB
25(OH)D3 SERPL-MCNC: 42.3 NG/ML
ALBUMIN SERPL ELPH-MCNC: 4.8 G/DL
ALP BLD-CCNC: 64 U/L
ALT SERPL-CCNC: 13 U/L
ANION GAP SERPL CALC-SCNC: 12 MMOL/L
APPEARANCE: CLEAR
AST SERPL-CCNC: 16 U/L
BACTERIA: NEGATIVE
BASOPHILS # BLD AUTO: 0.05 K/UL
BASOPHILS NFR BLD AUTO: 0.6 %
BILIRUB SERPL-MCNC: 0.4 MG/DL
BILIRUBIN URINE: NEGATIVE
BLOOD URINE: NEGATIVE
BUN SERPL-MCNC: 14 MG/DL
CALCIUM SERPL-MCNC: 9.9 MG/DL
CHLORIDE SERPL-SCNC: 102 MMOL/L
CHOLEST SERPL-MCNC: 236 MG/DL
CO2 SERPL-SCNC: 25 MMOL/L
COLOR: NORMAL
CREAT SERPL-MCNC: 0.78 MG/DL
EGFR: 81 ML/MIN/1.73M2
EOSINOPHIL # BLD AUTO: 0.17 K/UL
EOSINOPHIL NFR BLD AUTO: 2.2 %
ESTIMATED AVERAGE GLUCOSE: 103 MG/DL
FOLATE SERPL-MCNC: 17.7 NG/ML
GLUCOSE QUALITATIVE U: NEGATIVE
GLUCOSE SERPL-MCNC: 87 MG/DL
HBA1C MFR BLD HPLC: 5.2 %
HCT VFR BLD CALC: 36.8 %
HDLC SERPL-MCNC: 85 MG/DL
HGB BLD-MCNC: 12.3 G/DL
HYALINE CASTS: 0 /LPF
IMM GRANULOCYTES NFR BLD AUTO: 0.4 %
KETONES URINE: NEGATIVE
LDLC SERPL CALC-MCNC: 139 MG/DL
LEUKOCYTE ESTERASE URINE: ABNORMAL
LYMPHOCYTES # BLD AUTO: 2.14 K/UL
LYMPHOCYTES NFR BLD AUTO: 27.1 %
MAN DIFF?: NORMAL
MCHC RBC-ENTMCNC: 32.6 PG
MCHC RBC-ENTMCNC: 33.4 GM/DL
MCV RBC AUTO: 97.6 FL
MICROSCOPIC-UA: NORMAL
MONOCYTES # BLD AUTO: 0.69 K/UL
MONOCYTES NFR BLD AUTO: 8.7 %
NEUTROPHILS # BLD AUTO: 4.82 K/UL
NEUTROPHILS NFR BLD AUTO: 61 %
NITRITE URINE: NEGATIVE
NONHDLC SERPL-MCNC: 151 MG/DL
PH URINE: 5
PLATELET # BLD AUTO: 314 K/UL
POTASSIUM SERPL-SCNC: 4.2 MMOL/L
PROT SERPL-MCNC: 7.6 G/DL
PROTEIN URINE: NEGATIVE
RBC # BLD: 3.77 M/UL
RBC # FLD: 12.8 %
RED BLOOD CELLS URINE: 1 /HPF
SODIUM SERPL-SCNC: 139 MMOL/L
SPECIFIC GRAVITY URINE: 1.02
SQUAMOUS EPITHELIAL CELLS: 1 /HPF
TRIGL SERPL-MCNC: 61 MG/DL
TSH SERPL-ACNC: 3.56 UIU/ML
UROBILINOGEN URINE: NORMAL
VIT B12 SERPL-MCNC: 988 PG/ML
WBC # FLD AUTO: 7.9 K/UL
WHITE BLOOD CELLS URINE: 1 /HPF

## 2022-06-13 DIAGNOSIS — L25.9 UNSPECIFIED CONTACT DERMATITIS, UNSPECIFIED CAUSE: ICD-10-CM

## 2022-06-20 NOTE — ASU PREOP CHECKLIST - VIA
Pt presents to ED with c/o of two episodes of diarrhea since last night. Pt was admitted to [facility] in spring of this year with C.Diff. Pt had recent dental surgery with abcess and was on ABX. Supriya Toure Pt is concerned this is a recurrent C. Diff infection. ambulate Patient/Family

## 2022-08-01 ENCOUNTER — APPOINTMENT (OUTPATIENT)
Dept: ENDOCRINOLOGY | Facility: CLINIC | Age: 72
End: 2022-08-01

## 2022-08-08 ENCOUNTER — APPOINTMENT (OUTPATIENT)
Dept: ENDOCRINOLOGY | Facility: CLINIC | Age: 72
End: 2022-08-08

## 2022-08-08 VITALS — SYSTOLIC BLOOD PRESSURE: 138 MMHG | OXYGEN SATURATION: 100 % | DIASTOLIC BLOOD PRESSURE: 83 MMHG | HEART RATE: 77 BPM

## 2022-08-08 PROCEDURE — 96372 THER/PROPH/DIAG INJ SC/IM: CPT

## 2022-08-08 RX ORDER — DENOSUMAB 60 MG/ML
60 INJECTION SUBCUTANEOUS
Qty: 1 | Refills: 0 | Status: COMPLETED | OUTPATIENT
Start: 2022-08-08

## 2022-08-08 RX ADMIN — DENOSUMAB 60 MG/ML: 60 INJECTION SUBCUTANEOUS at 00:00

## 2022-08-12 ENCOUNTER — NON-APPOINTMENT (OUTPATIENT)
Age: 72
End: 2022-08-12

## 2022-08-12 LAB
25(OH)D3 SERPL-MCNC: 35.7 NG/ML
ALBUMIN SERPL ELPH-MCNC: 4.7 G/DL
ALP BLD-CCNC: 81 U/L
ALT SERPL-CCNC: 11 U/L
ANION GAP SERPL CALC-SCNC: 15 MMOL/L
AST SERPL-CCNC: 18 U/L
BASOPHILS # BLD AUTO: 0.05 K/UL
BASOPHILS NFR BLD AUTO: 0.7 %
BILIRUB SERPL-MCNC: 0.5 MG/DL
BUN SERPL-MCNC: 11 MG/DL
CALCIUM SERPL-MCNC: 10 MG/DL
CHLORIDE SERPL-SCNC: 107 MMOL/L
CO2 SERPL-SCNC: 22 MMOL/L
COLLAGEN NTX SER-SCNC: 14.2
COLLAGEN NTX UR-SCNC: 439 NMOL BCE
COLLAGEN NTX/CREAT UR-SRTO: 32
CREAT SERPL-MCNC: 0.77 MG/DL
CREAT UR-MCNC: 157.4 MG/DL
EGFR: 82 ML/MIN/1.73M2
EOSINOPHIL # BLD AUTO: 0.13 K/UL
EOSINOPHIL NFR BLD AUTO: 1.7 %
GLUCOSE SERPL-MCNC: 102 MG/DL
HCT VFR BLD CALC: 36.7 %
HGB BLD-MCNC: 12 G/DL
IMM GRANULOCYTES NFR BLD AUTO: 0.1 %
INTERPRETIVE GUIDE:: NORMAL
LYMPHOCYTES # BLD AUTO: 1.49 K/UL
LYMPHOCYTES NFR BLD AUTO: 20 %
MAN DIFF?: NORMAL
MCHC RBC-ENTMCNC: 32.7 GM/DL
MCHC RBC-ENTMCNC: 33.6 PG
MCV RBC AUTO: 102.8 FL
MONOCYTES # BLD AUTO: 0.69 K/UL
MONOCYTES NFR BLD AUTO: 9.2 %
NEUTROPHILS # BLD AUTO: 5.09 K/UL
NEUTROPHILS NFR BLD AUTO: 68.3 %
PLATELET # BLD AUTO: 303 K/UL
POTASSIUM SERPL-SCNC: 5.7 MMOL/L
PROT SERPL-MCNC: 7.4 G/DL
RBC # BLD: 3.57 M/UL
RBC # FLD: 13.7 %
SODIUM SERPL-SCNC: 143 MMOL/L
WBC # FLD AUTO: 7.46 K/UL

## 2022-08-15 LAB — COLLAGEN CTX SERPL-MCNC: 206 PG/ML

## 2022-11-25 ENCOUNTER — RESULT REVIEW (OUTPATIENT)
Age: 72
End: 2022-11-25

## 2022-11-25 ENCOUNTER — OUTPATIENT (OUTPATIENT)
Dept: OUTPATIENT SERVICES | Facility: HOSPITAL | Age: 72
LOS: 1 days | End: 2022-11-25
Payer: MEDICARE

## 2022-11-25 ENCOUNTER — APPOINTMENT (OUTPATIENT)
Dept: RADIOLOGY | Facility: CLINIC | Age: 72
End: 2022-11-25

## 2022-11-25 DIAGNOSIS — Z98.891 HISTORY OF UTERINE SCAR FROM PREVIOUS SURGERY: Chronic | ICD-10-CM

## 2022-11-25 DIAGNOSIS — Z98.890 OTHER SPECIFIED POSTPROCEDURAL STATES: Chronic | ICD-10-CM

## 2022-11-25 DIAGNOSIS — Z00.8 ENCOUNTER FOR OTHER GENERAL EXAMINATION: ICD-10-CM

## 2022-11-25 DIAGNOSIS — Z90.710 ACQUIRED ABSENCE OF BOTH CERVIX AND UTERUS: Chronic | ICD-10-CM

## 2022-11-25 PROCEDURE — 77080 DXA BONE DENSITY AXIAL: CPT | Mod: 26

## 2022-11-25 PROCEDURE — 77080 DXA BONE DENSITY AXIAL: CPT

## 2022-12-01 ENCOUNTER — APPOINTMENT (OUTPATIENT)
Dept: ENDOCRINOLOGY | Facility: CLINIC | Age: 72
End: 2022-12-01

## 2022-12-01 VITALS
BODY MASS INDEX: 25.98 KG/M2 | OXYGEN SATURATION: 96 % | WEIGHT: 133 LBS | DIASTOLIC BLOOD PRESSURE: 72 MMHG | HEART RATE: 80 BPM | SYSTOLIC BLOOD PRESSURE: 123 MMHG

## 2022-12-01 PROCEDURE — 99214 OFFICE O/P EST MOD 30 MIN: CPT

## 2022-12-01 NOTE — ASSESSMENT
[FreeTextEntry1] : 72 year old female with past medical history of Osteoporosis, Hypothyroidism who presents for management of her osteoporosis\par \par 1.Osteoporosis\par Patient warrants treatment for her OP given that she is at high risk for fractures. \par Diet, weight bearing and muscle strengthening exercise and fall prevention were discussed. Smoking cessation and alcohol consumption (no more then an average 2 drinks/day) was discussed. \par I counseled the patient regarding calcium and vitamin D intake. Calcium 1200 mg daily from diet and supplements (to be taken in divided doses as no more than 500-600 mg can be absorbed at one time)\par Great improvement on bone density \par Will cont Prolia for 2 more years\par \par

## 2022-12-01 NOTE — HISTORY OF PRESENT ILLNESS
[FreeTextEntry1] : Ms. MAEVE ELIZONDO  is a 72 year old female with past medical history of Osteoporosis, Hypothyroidism who presents for management of her osteoporosis. Patient feels well and denies any complaints.\par \par Bone History:\par Menopause: Last menstrual period 1999\par Osteoporosis diagnosed in 2015\par Fracture history: Patellar fracture\par Family history: No parental history of osteoporosis\par Treatment: Fosamax ([id number]) Prolia (1/2021\par Falls: Yes\par Height loss: No\par Kidney stones: No\par Dental health: Regular appointments, many implants all the time \par Exercise: walk\par Dairy intake: None\par Calcium supplements: 600 mg \par Multivitamin: None\par Vitamin D supplements: Vitamin D3 5,000 IU -20 mcg \par \par Osteoporosis risk factors include: Postmenopausal status,  race, prior fracture, falls,

## 2023-02-21 ENCOUNTER — APPOINTMENT (OUTPATIENT)
Dept: ENDOCRINOLOGY | Facility: CLINIC | Age: 73
End: 2023-02-21
Payer: MEDICARE

## 2023-02-21 VITALS
HEIGHT: 60 IN | BODY MASS INDEX: 26.31 KG/M2 | OXYGEN SATURATION: 99 % | SYSTOLIC BLOOD PRESSURE: 134 MMHG | DIASTOLIC BLOOD PRESSURE: 81 MMHG | HEART RATE: 84 BPM | WEIGHT: 134 LBS

## 2023-02-21 PROCEDURE — 96372 THER/PROPH/DIAG INJ SC/IM: CPT

## 2023-02-21 RX ORDER — DENOSUMAB 60 MG/ML
60 INJECTION SUBCUTANEOUS
Qty: 1 | Refills: 0 | Status: COMPLETED | OUTPATIENT
Start: 2023-02-21

## 2023-02-21 RX ADMIN — DENOSUMAB 60 MG/ML: 60 INJECTION SUBCUTANEOUS at 00:00

## 2023-02-24 LAB
25(OH)D3 SERPL-MCNC: 42.7 NG/ML
ALBUMIN SERPL ELPH-MCNC: 4.9 G/DL
ALP BLD-CCNC: 85 U/L
ALT SERPL-CCNC: 18 U/L
ANION GAP SERPL CALC-SCNC: 15 MMOL/L
AST SERPL-CCNC: 19 U/L
BASOPHILS # BLD AUTO: 0.07 K/UL
BASOPHILS NFR BLD AUTO: 0.9 %
BILIRUB SERPL-MCNC: 0.7 MG/DL
BUN SERPL-MCNC: 17 MG/DL
CALCIUM SERPL-MCNC: 10.7 MG/DL
CHLORIDE SERPL-SCNC: 101 MMOL/L
CO2 SERPL-SCNC: 24 MMOL/L
COLLAGEN NTX SER-SCNC: 16.1
COLLAGEN NTX UR-SCNC: 289 NMOL BCE
COLLAGEN NTX/CREAT UR-SRTO: 34
CREAT SERPL-MCNC: 0.82 MG/DL
CREAT UR-MCNC: 96.3 MG/DL
EGFR: 76 ML/MIN/1.73M2
EOSINOPHIL # BLD AUTO: 0.2 K/UL
EOSINOPHIL NFR BLD AUTO: 2.6 %
GLUCOSE SERPL-MCNC: 93 MG/DL
HCT VFR BLD CALC: 39.3 %
HGB BLD-MCNC: 12.6 G/DL
IMM GRANULOCYTES NFR BLD AUTO: 0.3 %
INTERPRETIVE GUIDE:: NORMAL
LYMPHOCYTES # BLD AUTO: 2.12 K/UL
LYMPHOCYTES NFR BLD AUTO: 27 %
MAN DIFF?: NORMAL
MCHC RBC-ENTMCNC: 32 PG
MCHC RBC-ENTMCNC: 32.1 GM/DL
MCV RBC AUTO: 99.7 FL
MONOCYTES # BLD AUTO: 0.7 K/UL
MONOCYTES NFR BLD AUTO: 8.9 %
NEUTROPHILS # BLD AUTO: 4.73 K/UL
NEUTROPHILS NFR BLD AUTO: 60.3 %
PLATELET # BLD AUTO: 360 K/UL
POTASSIUM SERPL-SCNC: 5.4 MMOL/L
PROT SERPL-MCNC: 8 G/DL
RBC # BLD: 3.94 M/UL
RBC # FLD: 12.6 %
SODIUM SERPL-SCNC: 141 MMOL/L
WBC # FLD AUTO: 7.84 K/UL

## 2023-02-27 ENCOUNTER — NON-APPOINTMENT (OUTPATIENT)
Age: 73
End: 2023-02-27

## 2023-03-03 LAB — COLLAGEN CTX SERPL-MCNC: 229 PG/ML

## 2023-04-06 DIAGNOSIS — R92.2 INCONCLUSIVE MAMMOGRAM: ICD-10-CM

## 2023-06-01 ENCOUNTER — APPOINTMENT (OUTPATIENT)
Dept: MAMMOGRAPHY | Facility: CLINIC | Age: 73
End: 2023-06-01
Payer: MEDICARE

## 2023-06-01 ENCOUNTER — OUTPATIENT (OUTPATIENT)
Dept: OUTPATIENT SERVICES | Facility: HOSPITAL | Age: 73
LOS: 1 days | End: 2023-06-01
Payer: MEDICARE

## 2023-06-01 ENCOUNTER — APPOINTMENT (OUTPATIENT)
Dept: ULTRASOUND IMAGING | Facility: CLINIC | Age: 73
End: 2023-06-01
Payer: MEDICARE

## 2023-06-01 ENCOUNTER — RESULT REVIEW (OUTPATIENT)
Age: 73
End: 2023-06-01

## 2023-06-01 DIAGNOSIS — R92.2 INCONCLUSIVE MAMMOGRAM: ICD-10-CM

## 2023-06-01 DIAGNOSIS — Z00.00 ENCOUNTER FOR GENERAL ADULT MEDICAL EXAMINATION WITHOUT ABNORMAL FINDINGS: ICD-10-CM

## 2023-06-01 DIAGNOSIS — Z90.710 ACQUIRED ABSENCE OF BOTH CERVIX AND UTERUS: Chronic | ICD-10-CM

## 2023-06-01 PROCEDURE — 77063 BREAST TOMOSYNTHESIS BI: CPT | Mod: 26

## 2023-06-01 PROCEDURE — 77067 SCR MAMMO BI INCL CAD: CPT | Mod: 26

## 2023-06-01 PROCEDURE — 76641 ULTRASOUND BREAST COMPLETE: CPT | Mod: 26,50

## 2023-06-09 ENCOUNTER — RESULT REVIEW (OUTPATIENT)
Age: 73
End: 2023-06-09

## 2023-06-09 ENCOUNTER — APPOINTMENT (OUTPATIENT)
Dept: ULTRASOUND IMAGING | Facility: CLINIC | Age: 73
End: 2023-06-09

## 2023-06-09 DIAGNOSIS — Z00.8 ENCOUNTER FOR OTHER GENERAL EXAMINATION: ICD-10-CM

## 2023-06-09 PROCEDURE — 76642 ULTRASOUND BREAST LIMITED: CPT | Mod: 26,RT

## 2023-06-09 PROCEDURE — 76641 ULTRASOUND BREAST COMPLETE: CPT

## 2023-06-09 PROCEDURE — 77063 BREAST TOMOSYNTHESIS BI: CPT

## 2023-06-09 PROCEDURE — 77067 SCR MAMMO BI INCL CAD: CPT

## 2023-06-09 PROCEDURE — 76642 ULTRASOUND BREAST LIMITED: CPT

## 2023-06-14 ENCOUNTER — APPOINTMENT (OUTPATIENT)
Dept: INTERNAL MEDICINE | Facility: CLINIC | Age: 73
End: 2023-06-14
Payer: MEDICARE

## 2023-06-14 ENCOUNTER — NON-APPOINTMENT (OUTPATIENT)
Age: 73
End: 2023-06-14

## 2023-06-14 VITALS
SYSTOLIC BLOOD PRESSURE: 132 MMHG | TEMPERATURE: 97.9 F | OXYGEN SATURATION: 98 % | RESPIRATION RATE: 16 BRPM | BODY MASS INDEX: 26.31 KG/M2 | DIASTOLIC BLOOD PRESSURE: 86 MMHG | HEIGHT: 60 IN | HEART RATE: 82 BPM | WEIGHT: 134 LBS

## 2023-06-14 PROCEDURE — 93000 ELECTROCARDIOGRAM COMPLETE: CPT | Mod: 59

## 2023-06-14 PROCEDURE — G0439: CPT

## 2023-06-14 NOTE — HISTORY OF PRESENT ILLNESS
[de-identified] : Here today for follow up. \par Feels well.\par Nervous about the upcoming breast biospy

## 2023-06-14 NOTE — HEALTH RISK ASSESSMENT
[Very Good] : ~his/her~  mood as very good [0] : 2) Feeling down, depressed, or hopeless: Not at all (0) [PHQ-2 Negative - No further assessment needed] : PHQ-2 Negative - No further assessment needed [Patient reported mammogram was abnormal] : Patient reported mammogram was abnormal [[id number]] : 0 [MammogramDate] : 06/23 [MammogramComments] : [facility] for biospy

## 2023-06-15 ENCOUNTER — APPOINTMENT (OUTPATIENT)
Dept: ULTRASOUND IMAGING | Facility: CLINIC | Age: 73
End: 2023-06-15
Payer: MEDICARE

## 2023-06-15 ENCOUNTER — OUTPATIENT (OUTPATIENT)
Dept: OUTPATIENT SERVICES | Facility: HOSPITAL | Age: 73
LOS: 1 days | End: 2023-06-15
Payer: MEDICARE

## 2023-06-15 ENCOUNTER — RESULT REVIEW (OUTPATIENT)
Age: 73
End: 2023-06-15

## 2023-06-15 DIAGNOSIS — Z00.8 ENCOUNTER FOR OTHER GENERAL EXAMINATION: ICD-10-CM

## 2023-06-15 DIAGNOSIS — R92.8 OTHER ABNORMAL AND INCONCLUSIVE FINDINGS ON DIAGNOSTIC IMAGING OF BREAST: ICD-10-CM

## 2023-06-15 DIAGNOSIS — Z90.710 ACQUIRED ABSENCE OF BOTH CERVIX AND UTERUS: Chronic | ICD-10-CM

## 2023-06-15 DIAGNOSIS — Z98.890 OTHER SPECIFIED POSTPROCEDURAL STATES: Chronic | ICD-10-CM

## 2023-06-15 LAB
25(OH)D3 SERPL-MCNC: 41.9 NG/ML
ALBUMIN SERPL ELPH-MCNC: 4.7 G/DL
ALP BLD-CCNC: 74 U/L
ALT SERPL-CCNC: 16 U/L
ANION GAP SERPL CALC-SCNC: 15 MMOL/L
APPEARANCE: CLEAR
AST SERPL-CCNC: 18 U/L
BACTERIA: NEGATIVE /HPF
BILIRUB SERPL-MCNC: 0.5 MG/DL
BILIRUBIN URINE: NEGATIVE
BLOOD URINE: NEGATIVE
BUN SERPL-MCNC: 13 MG/DL
CALCIUM SERPL-MCNC: 10.3 MG/DL
CAST: 0 /LPF
CHLORIDE SERPL-SCNC: 99 MMOL/L
CHOLEST SERPL-MCNC: 257 MG/DL
CO2 SERPL-SCNC: 24 MMOL/L
COLOR: YELLOW
CREAT SERPL-MCNC: 0.77 MG/DL
EGFR: 82 ML/MIN/1.73M2
EPITHELIAL CELLS: 2 /HPF
ESTIMATED AVERAGE GLUCOSE: 108 MG/DL
FOLATE SERPL-MCNC: 17.1 NG/ML
GLUCOSE QUALITATIVE U: NEGATIVE MG/DL
GLUCOSE SERPL-MCNC: 77 MG/DL
HBA1C MFR BLD HPLC: 5.4 %
HDLC SERPL-MCNC: 85 MG/DL
KETONES URINE: NEGATIVE MG/DL
LDLC SERPL CALC-MCNC: 150 MG/DL
LEUKOCYTE ESTERASE URINE: ABNORMAL
MICROSCOPIC-UA: NORMAL
NITRITE URINE: NEGATIVE
NONHDLC SERPL-MCNC: 172 MG/DL
PH URINE: 5.5
POTASSIUM SERPL-SCNC: 4.5 MMOL/L
PROT SERPL-MCNC: 7.5 G/DL
PROTEIN URINE: NEGATIVE MG/DL
RED BLOOD CELLS URINE: 1 /HPF
SODIUM SERPL-SCNC: 138 MMOL/L
SPECIFIC GRAVITY URINE: 1.02
TRIGL SERPL-MCNC: 112 MG/DL
TSH SERPL-ACNC: 3.58 UIU/ML
UROBILINOGEN URINE: 0.2 MG/DL
VIT B12 SERPL-MCNC: 493 PG/ML
WHITE BLOOD CELLS URINE: 4 /HPF

## 2023-06-15 PROCEDURE — 88341 IMHCHEM/IMCYTCHM EA ADD ANTB: CPT

## 2023-06-15 PROCEDURE — 19083 BX BREAST 1ST LESION US IMAG: CPT

## 2023-06-15 PROCEDURE — 19083 BX BREAST 1ST LESION US IMAG: CPT | Mod: RT

## 2023-06-15 PROCEDURE — 88305 TISSUE EXAM BY PATHOLOGIST: CPT | Mod: 26

## 2023-06-15 PROCEDURE — 88305 TISSUE EXAM BY PATHOLOGIST: CPT

## 2023-06-15 PROCEDURE — 88342 IMHCHEM/IMCYTCHM 1ST ANTB: CPT | Mod: 26

## 2023-06-15 PROCEDURE — 88360 TUMOR IMMUNOHISTOCHEM/MANUAL: CPT

## 2023-06-15 PROCEDURE — 77065 DX MAMMO INCL CAD UNI: CPT

## 2023-06-15 PROCEDURE — 77065 DX MAMMO INCL CAD UNI: CPT | Mod: 26,RT

## 2023-06-15 PROCEDURE — A4648: CPT

## 2023-06-23 ENCOUNTER — APPOINTMENT (OUTPATIENT)
Dept: PLASTIC SURGERY | Facility: CLINIC | Age: 73
End: 2023-06-23
Payer: MEDICARE

## 2023-06-23 VITALS
WEIGHT: 134 LBS | HEIGHT: 60 IN | DIASTOLIC BLOOD PRESSURE: 81 MMHG | SYSTOLIC BLOOD PRESSURE: 155 MMHG | TEMPERATURE: 97.7 F | OXYGEN SATURATION: 98 % | BODY MASS INDEX: 26.31 KG/M2 | HEART RATE: 87 BPM

## 2023-06-23 PROCEDURE — 99205 OFFICE O/P NEW HI 60 MIN: CPT

## 2023-06-23 RX ORDER — DENOSUMAB 60 MG/ML
60 INJECTION SUBCUTANEOUS
Refills: 0 | Status: ACTIVE | COMMUNITY

## 2023-06-23 NOTE — PHYSICAL EXAM
[Normocephalic] : normocephalic [Atraumatic] : atraumatic [EOMI] : extra ocular movement intact [Sclera nonicteric] : sclera nonicteric [Supple] : supple [No Supraclavicular Adenopathy] : no supraclavicular adenopathy [No Cervical Adenopathy] : no cervical adenopathy [No Thyromegaly] : no thyromegaly [Clear to Auscultation Bilat] : clear to auscultation bilaterally [Normal Sinus Rhythm] : normal sinus rhythm [Normal S1, S2] : normal S1 and S2 [No Gallops] : no gallops [No Rubs] : no pericardial rub [Examined in the supine and seated position] : examined in the supine and seated position [Bra Size: ___] : Bra Size: [unfilled] [No dominant masses] : no dominant masses left breast [No Nipple Retraction] : no left nipple retraction [No Nipple Discharge] : no left nipple discharge [No Axillary Lymphadenopathy] : no left axillary lymphadenopathy [No Edema] : no edema [No Rashes] : no rashes [No Ulceration] : no ulceration [de-identified] : Core scar: right 9:00 (N9.5cm); palpable mass with ecchymosis right 9:30 (N7.5cm) 1.2 x 1.3 cm, consistent with the site of the recently biopsies cancer.

## 2023-06-23 NOTE — HISTORY OF PRESENT ILLNESS
[FreeTextEntry1] : Patient is a 73 year old female here today regarding her recent diagnosis of right 9:00 invasive lobular carcinoma.\par She has no family history of breast or ovarian cancer.  She is relatively healthy.\par 6/1/2023 Bilateral mammogram: Smita Lifetime Risk: 3.5%\par No suspicious mass, suspicious microcalcifications, or other sign of malignancy is identified.\par Bilateral breast ultrasound: At right 9:00, 9 cm from the nipple there is a 0.2 x 0.4 x 0.4 cm ill-defined hypoechoic area, indeterminate. Recommend further evaluation with right targeted ultrasound. \par At right 9:00, 8 cm from the nipple there is a stable 0.9 x 0.5 x 0.3 cm lobulated hypoechoic mass, stable since at least 11/4/2020.\par At left 3:00, 12 cm from the nipple there is a 0.8 x 0.4 x 0.6 cm intramammary lymph node. BI-RADS 0. \par 6/9/2023 Right breast ultrasound: \par In the 9:00 location 9 cm from the nipple again visualized is an ill-defined hypoechoic nonmass region measuring 0.5 x 0.7 x 0.6 cm.\par No right axillary lymphadenopathy is appreciated. Recommend US guided core biopsy. BI-RADS 4B. \par 6/15/2023 Right 9:00 (9cmFN) US guided core biopsy: A twirl shaped marking clip was placed. \par Pathology revealed invasive lobular carcinoma, invasive tumor measures at least 6 mm, microcalcifications not detected, lymphovascular permeation by tumor not seen, ID papilloma, 2mm. ER+ 90%/RI+ 70%/Her2- (1+ membrane staining). \par Appropriate surgical and oncologic management is recommended. Breast MRI is also recommended prior to definitive surgical management. These results are concordant. \par She reports all prior mammo/sonos have been benign and that prior to her recent biopsy, she has never had a breast biopsy in the past. She has never been able to feel any palpable masses of her right breast. \par She is scheduled for bilateral breast MRI on 7/5/23. \par She is here today with her daughter, Evangelina.

## 2023-06-23 NOTE — ASSESSMENT
[FreeTextEntry1] : Newly diagnosed right breast invasive lobular carcinoma, ER+/OR+/Her2-\par Clinical stage I\par \par 1. The surgical treatment options of right Umm  localization wide excision lumpectomy, right axillary sentinel lymph node (LN) biopsy, possible right axillary LN dissection followed by post-operative XRT (to reduce risk of local recurrence), vs. right total mastectomy, right axillary sentinel LN biopsy, possible right axillary LN dissection, vs. bilateral total mastectomies, right axillary sentinel LN biopsies, if she should so choose, were discussed in detail. The indications, alternatives, benefits and risks were discussed, including but not limited to bleeding, infection, pain, scar, swelling, numbness, change in breast appearance, recurrence, potential need for additional surgery and lymphedema. The breast cancer booklet and postoperative instructions were reviewed and provided to the patient. \par 2. The lymphedema instruction sheet was reviewed and provided.\par 3. I discussed with her the potential for postoperative radiation therapy.\par 4. I discussed with her the potential for postoperative adjuvant therapy, including the possibility of chemotherapy and/or anti-hormonal therapy as indicated. \par 5. Slide review: to be requested if patient opts to proceed.\par 6. Bilateral breast MRI: advise preop to rule out additional areas of disease. Appt 7/5/23. \par 7. Genetic testing: do not recommend.\par 8. Reconstruction: discussed with her the option of reconstruction if she undergoes mastectomy(ies).\par 9. CXR: Rx provided, to be done preop as screening for distant metastatic cancer.\par 10. Complete Metabolic panel (CMP) done, normal.\par 11. She states that if the MRI is otherwise negative, she will opt for a lumpectomy with SLN biopsy.\par 11. Annual bilateral mammogram due 6/2024

## 2023-06-23 NOTE — CONSULT LETTER
[Dear  ___] : Dear  [unfilled], [Consult Letter:] : I had the pleasure of evaluating your patient, [unfilled]. [Please see my note below.] : Please see my note below. [Consult Closing:] : Thank you very much for allowing me to participate in the care of this patient.  If you have any questions, please do not hesitate to contact me. [Sincerely,] : Sincerely, [FreeTextEntry3] : Susan M. Palleschi, MD, FACS\par Division of Breast Surgery\par Director, Breast Surgery\par [facility]\par [address]\par [address]\[place] [address]\par (Phone) [phone]\par (Fax) [phone] n\par [address]\[place] [address]\par (Phone) [phone]\par (Fax) [phone]

## 2023-06-27 ENCOUNTER — APPOINTMENT (OUTPATIENT)
Dept: RADIOLOGY | Facility: CLINIC | Age: 73
End: 2023-06-27
Payer: MEDICARE

## 2023-06-27 ENCOUNTER — APPOINTMENT (OUTPATIENT)
Dept: MRI IMAGING | Facility: CLINIC | Age: 73
End: 2023-06-27
Payer: MEDICARE

## 2023-06-27 ENCOUNTER — NON-APPOINTMENT (OUTPATIENT)
Age: 73
End: 2023-06-27

## 2023-06-27 PROCEDURE — A9585: CPT | Mod: JW

## 2023-06-27 PROCEDURE — 77049 MRI BREAST C-+ W/CAD BI: CPT

## 2023-06-27 PROCEDURE — 71045 X-RAY EXAM CHEST 1 VIEW: CPT

## 2023-06-29 ENCOUNTER — OUTPATIENT (OUTPATIENT)
Dept: OUTPATIENT SERVICES | Facility: HOSPITAL | Age: 73
LOS: 1 days | End: 2023-06-29
Payer: MEDICARE

## 2023-06-29 VITALS
HEIGHT: 59 IN | DIASTOLIC BLOOD PRESSURE: 80 MMHG | WEIGHT: 135.8 LBS | OXYGEN SATURATION: 98 % | RESPIRATION RATE: 16 BRPM | SYSTOLIC BLOOD PRESSURE: 145 MMHG | TEMPERATURE: 98 F | HEART RATE: 93 BPM

## 2023-06-29 DIAGNOSIS — M81.0 AGE-RELATED OSTEOPOROSIS WITHOUT CURRENT PATHOLOGICAL FRACTURE: ICD-10-CM

## 2023-06-29 DIAGNOSIS — Z98.891 HISTORY OF UTERINE SCAR FROM PREVIOUS SURGERY: Chronic | ICD-10-CM

## 2023-06-29 DIAGNOSIS — C50.811 MALIGNANT NEOPLASM OF OVERLAPPING SITES OF RIGHT FEMALE BREAST: ICD-10-CM

## 2023-06-29 DIAGNOSIS — Z01.818 ENCOUNTER FOR OTHER PREPROCEDURAL EXAMINATION: ICD-10-CM

## 2023-06-29 DIAGNOSIS — Z98.890 OTHER SPECIFIED POSTPROCEDURAL STATES: Chronic | ICD-10-CM

## 2023-06-29 DIAGNOSIS — Z90.710 ACQUIRED ABSENCE OF BOTH CERVIX AND UTERUS: Chronic | ICD-10-CM

## 2023-06-29 PROCEDURE — G0463: CPT

## 2023-06-29 NOTE — H&P PST ADULT - PROBLEM SELECTOR PLAN 1
Pre-op instructions given. Pt verbalized understanding  Chlorhexidine wash instructions given  Pt instructed to hold all NSAIDs + herbal supplements/vitamins 7 days before surgery  Labs, ecg obtained - all wnl. Pt reports surgeon did not request m/c - pt not sent for M/C

## 2023-06-29 NOTE — H&P PST ADULT - HISTORY OF PRESENT ILLNESS
72y/o female with medical h/o Osteoporosis, reports annual mammogram indicate Breast mass, right and presents today for PST for scheduled Mastectomy, partial - right on 7/13/2023

## 2023-06-29 NOTE — H&P PST ADULT - NSANTHOSAYNRD_GEN_A_CORE
neck 13.5inches/No. LIAM screening performed.  STOP BANG Legend: 0-2 = LOW Risk; 3-4 = INTERMEDIATE Risk; 5-8 = HIGH Risk

## 2023-06-29 NOTE — H&P PST ADULT - NSICDXPASTSURGICALHX_GEN_ALL_CORE_FT
PAST SURGICAL HISTORY:  H/O removal of cyst     H/O:  x 2    History of D&C     S/P hysterectomy

## 2023-06-29 NOTE — H&P PST ADULT - ATTENDING COMMENTS
73 year old female who was recently diagnosed with right invasive breast cancer x 2. She presents to undergo a right santiago  localization wide excision lumpectomy, right axillary deep sentinel lymph node biopsy with nuclear injection, possible right axillary lymph node dissection.

## 2023-06-29 NOTE — H&P PST ADULT - MUSCULOSKELETAL
ROM intact/joint swelling/normal gait/strength 5/5 bilateral upper extremities/strength 5/5 bilateral lower extremities negative

## 2023-06-29 NOTE — H&P PST ADULT - NSICDXPASTMEDICALHX_GEN_ALL_CORE_FT
PAST MEDICAL HISTORY:  Breast cancer, right     H/O ganglion cyst left wrist    History of osteoporosis

## 2023-06-30 ENCOUNTER — OUTPATIENT (OUTPATIENT)
Dept: OUTPATIENT SERVICES | Facility: HOSPITAL | Age: 73
LOS: 1 days | End: 2023-06-30
Payer: MEDICARE

## 2023-06-30 ENCOUNTER — RESULT REVIEW (OUTPATIENT)
Age: 73
End: 2023-06-30

## 2023-06-30 ENCOUNTER — APPOINTMENT (OUTPATIENT)
Dept: MRI IMAGING | Facility: CLINIC | Age: 73
End: 2023-06-30
Payer: MEDICARE

## 2023-06-30 ENCOUNTER — NON-APPOINTMENT (OUTPATIENT)
Age: 73
End: 2023-06-30

## 2023-06-30 DIAGNOSIS — Z98.890 OTHER SPECIFIED POSTPROCEDURAL STATES: Chronic | ICD-10-CM

## 2023-06-30 DIAGNOSIS — Z00.8 ENCOUNTER FOR OTHER GENERAL EXAMINATION: ICD-10-CM

## 2023-06-30 DIAGNOSIS — R92.8 OTHER ABNORMAL AND INCONCLUSIVE FINDINGS ON DIAGNOSTIC IMAGING OF BREAST: ICD-10-CM

## 2023-06-30 DIAGNOSIS — C50.811 MALIGNANT NEOPLASM OF OVERLAPPING SITES OF RIGHT FEMALE BREAST: ICD-10-CM

## 2023-06-30 DIAGNOSIS — Z98.891 HISTORY OF UTERINE SCAR FROM PREVIOUS SURGERY: Chronic | ICD-10-CM

## 2023-06-30 DIAGNOSIS — Z90.710 ACQUIRED ABSENCE OF BOTH CERVIX AND UTERUS: Chronic | ICD-10-CM

## 2023-06-30 PROBLEM — C50.911 MALIGNANT NEOPLASM OF UNSPECIFIED SITE OF RIGHT FEMALE BREAST: Chronic | Status: ACTIVE | Noted: 2023-06-29

## 2023-06-30 PROCEDURE — 77065 DX MAMMO INCL CAD UNI: CPT

## 2023-06-30 PROCEDURE — 88305 TISSUE EXAM BY PATHOLOGIST: CPT

## 2023-06-30 PROCEDURE — 77065 DX MAMMO INCL CAD UNI: CPT | Mod: 26,RT

## 2023-06-30 PROCEDURE — A9585: CPT

## 2023-06-30 PROCEDURE — 19085 BX BREAST 1ST LESION MR IMAG: CPT | Mod: RT

## 2023-06-30 PROCEDURE — 88342 IMHCHEM/IMCYTCHM 1ST ANTB: CPT | Mod: 26,59

## 2023-06-30 PROCEDURE — 88360 TUMOR IMMUNOHISTOCHEM/MANUAL: CPT | Mod: 26

## 2023-06-30 PROCEDURE — A4648: CPT

## 2023-06-30 PROCEDURE — 88305 TISSUE EXAM BY PATHOLOGIST: CPT | Mod: 26

## 2023-06-30 PROCEDURE — 19085 BX BREAST 1ST LESION MR IMAG: CPT

## 2023-06-30 PROCEDURE — 88341 IMHCHEM/IMCYTCHM EA ADD ANTB: CPT

## 2023-06-30 PROCEDURE — 88360 TUMOR IMMUNOHISTOCHEM/MANUAL: CPT

## 2023-07-07 ENCOUNTER — NON-APPOINTMENT (OUTPATIENT)
Age: 73
End: 2023-07-07

## 2023-07-07 ENCOUNTER — RESULT REVIEW (OUTPATIENT)
Age: 73
End: 2023-07-07

## 2023-07-10 ENCOUNTER — NON-APPOINTMENT (OUTPATIENT)
Age: 73
End: 2023-07-10

## 2023-07-10 ENCOUNTER — RESULT REVIEW (OUTPATIENT)
Age: 73
End: 2023-07-10

## 2023-07-10 LAB — SURGICAL PATHOLOGY STUDY: SIGNIFICANT CHANGE UP

## 2023-07-12 ENCOUNTER — APPOINTMENT (OUTPATIENT)
Dept: MAMMOGRAPHY | Facility: CLINIC | Age: 73
End: 2023-07-12
Payer: MEDICARE

## 2023-07-12 ENCOUNTER — OUTPATIENT (OUTPATIENT)
Dept: OUTPATIENT SERVICES | Facility: HOSPITAL | Age: 73
LOS: 1 days | End: 2023-07-12
Payer: MEDICARE

## 2023-07-12 ENCOUNTER — TRANSCRIPTION ENCOUNTER (OUTPATIENT)
Age: 73
End: 2023-07-12

## 2023-07-12 ENCOUNTER — RESULT REVIEW (OUTPATIENT)
Age: 73
End: 2023-07-12

## 2023-07-12 ENCOUNTER — NON-APPOINTMENT (OUTPATIENT)
Age: 73
End: 2023-07-12

## 2023-07-12 DIAGNOSIS — Z98.890 OTHER SPECIFIED POSTPROCEDURAL STATES: Chronic | ICD-10-CM

## 2023-07-12 DIAGNOSIS — C50.811 MALIGNANT NEOPLASM OF OVERLAPPING SITES OF RIGHT FEMALE BREAST: ICD-10-CM

## 2023-07-12 DIAGNOSIS — Z98.891 HISTORY OF UTERINE SCAR FROM PREVIOUS SURGERY: Chronic | ICD-10-CM

## 2023-07-12 PROCEDURE — 19282 PERQ DEVICE BREAST EA IMAG: CPT | Mod: RT

## 2023-07-12 PROCEDURE — C1739: CPT

## 2023-07-12 PROCEDURE — 19281 PERQ DEVICE BREAST 1ST IMAG: CPT

## 2023-07-12 PROCEDURE — 19281 PERQ DEVICE BREAST 1ST IMAG: CPT | Mod: RT

## 2023-07-12 PROCEDURE — 19282 PERQ DEVICE BREAST EA IMAG: CPT

## 2023-07-13 ENCOUNTER — TRANSCRIPTION ENCOUNTER (OUTPATIENT)
Age: 73
End: 2023-07-13

## 2023-07-13 ENCOUNTER — NON-APPOINTMENT (OUTPATIENT)
Age: 73
End: 2023-07-13

## 2023-07-13 ENCOUNTER — APPOINTMENT (OUTPATIENT)
Dept: PLASTIC SURGERY | Facility: HOSPITAL | Age: 73
End: 2023-07-13
Payer: MEDICARE

## 2023-07-13 ENCOUNTER — OUTPATIENT (OUTPATIENT)
Dept: OUTPATIENT SERVICES | Facility: HOSPITAL | Age: 73
LOS: 1 days | End: 2023-07-13
Payer: MEDICARE

## 2023-07-13 ENCOUNTER — RESULT REVIEW (OUTPATIENT)
Age: 73
End: 2023-07-13

## 2023-07-13 VITALS
DIASTOLIC BLOOD PRESSURE: 57 MMHG | OXYGEN SATURATION: 95 % | TEMPERATURE: 98 F | RESPIRATION RATE: 16 BRPM | SYSTOLIC BLOOD PRESSURE: 100 MMHG | HEART RATE: 90 BPM

## 2023-07-13 VITALS
SYSTOLIC BLOOD PRESSURE: 148 MMHG | HEIGHT: 59 IN | DIASTOLIC BLOOD PRESSURE: 77 MMHG | WEIGHT: 135.8 LBS | RESPIRATION RATE: 15 BRPM | HEART RATE: 83 BPM | TEMPERATURE: 98 F | OXYGEN SATURATION: 96 %

## 2023-07-13 DIAGNOSIS — Z98.890 OTHER SPECIFIED POSTPROCEDURAL STATES: Chronic | ICD-10-CM

## 2023-07-13 DIAGNOSIS — Z98.891 HISTORY OF UTERINE SCAR FROM PREVIOUS SURGERY: Chronic | ICD-10-CM

## 2023-07-13 DIAGNOSIS — Z90.710 ACQUIRED ABSENCE OF BOTH CERVIX AND UTERUS: Chronic | ICD-10-CM

## 2023-07-13 DIAGNOSIS — C50.811 MALIGNANT NEOPLASM OF OVERLAPPING SITES OF RIGHT FEMALE BREAST: ICD-10-CM

## 2023-07-13 PROCEDURE — 38525 BIOPSY/REMOVAL LYMPH NODES: CPT | Mod: RT

## 2023-07-13 PROCEDURE — 38792 RA TRACER ID OF SENTINL NODE: CPT

## 2023-07-13 PROCEDURE — A9541: CPT

## 2023-07-13 PROCEDURE — 88307 TISSUE EXAM BY PATHOLOGIST: CPT | Mod: 26

## 2023-07-13 PROCEDURE — 19301 PARTIAL MASTECTOMY: CPT | Mod: RT

## 2023-07-13 PROCEDURE — 88307 TISSUE EXAM BY PATHOLOGIST: CPT

## 2023-07-13 PROCEDURE — 88304 TISSUE EXAM BY PATHOLOGIST: CPT | Mod: 26

## 2023-07-13 PROCEDURE — 88304 TISSUE EXAM BY PATHOLOGIST: CPT

## 2023-07-13 PROCEDURE — 76098 X-RAY EXAM SURGICAL SPECIMEN: CPT | Mod: 26

## 2023-07-13 PROCEDURE — 76098 X-RAY EXAM SURGICAL SPECIMEN: CPT

## 2023-07-13 PROCEDURE — 38792 RA TRACER ID OF SENTINL NODE: CPT | Mod: RT

## 2023-07-13 PROCEDURE — 38525 BIOPSY/REMOVAL LYMPH NODES: CPT

## 2023-07-13 RX ORDER — BACILLUS COAGULANS/INULIN 1B-250 MG
1 CAPSULE ORAL
Refills: 0 | DISCHARGE

## 2023-07-13 RX ORDER — HYDROMORPHONE HYDROCHLORIDE 2 MG/ML
0.5 INJECTION INTRAMUSCULAR; INTRAVENOUS; SUBCUTANEOUS
Refills: 0 | Status: DISCONTINUED | OUTPATIENT
Start: 2023-07-13 | End: 2023-07-13

## 2023-07-13 RX ORDER — SODIUM CHLORIDE 9 MG/ML
1000 INJECTION, SOLUTION INTRAVENOUS
Refills: 0 | Status: DISCONTINUED | OUTPATIENT
Start: 2023-07-13 | End: 2023-07-13

## 2023-07-13 RX ORDER — DENOSUMAB 60 MG/ML
1 INJECTION SUBCUTANEOUS
Qty: 0 | Refills: 0 | DISCHARGE

## 2023-07-13 RX ORDER — HYDROMORPHONE HYDROCHLORIDE 2 MG/ML
1 INJECTION INTRAMUSCULAR; INTRAVENOUS; SUBCUTANEOUS
Refills: 0 | Status: DISCONTINUED | OUTPATIENT
Start: 2023-07-13 | End: 2023-07-13

## 2023-07-13 RX ORDER — ONDANSETRON 8 MG/1
4 TABLET, FILM COATED ORAL ONCE
Refills: 0 | Status: DISCONTINUED | OUTPATIENT
Start: 2023-07-13 | End: 2023-07-13

## 2023-07-13 RX ORDER — ASCORBIC ACID 60 MG
1 TABLET,CHEWABLE ORAL
Refills: 0 | DISCHARGE

## 2023-07-13 RX ADMIN — SODIUM CHLORIDE 50 MILLILITER(S): 9 INJECTION, SOLUTION INTRAVENOUS at 11:01

## 2023-07-13 NOTE — BRIEF OPERATIVE NOTE - NSICDXBRIEFPREOP_GEN_ALL_CORE_FT
PRE-OP DIAGNOSIS:  Breast cancer of upper-outer quadrant of right female breast 13-Jul-2023 11:26:18  Palleschi, Susan M

## 2023-07-13 NOTE — BRIEF OPERATIVE NOTE - SPECIMENS
right axillary sentinel lymph nodes x  ; right upper outer lumpectomy, right 9:00 breast core biopsy scar right axillary sentinel lymph nodes x 2; right upper outer lumpectomy, right 9:00 breast core biopsy scar, right 10:00 breast core biopsy scar

## 2023-07-13 NOTE — BRIEF OPERATIVE NOTE - NSICDXBRIEFPROCEDURE_GEN_ALL_CORE_FT
PROCEDURES:  Lumpectomy of right breast with sentinel node biopsy 13-Jul-2023 11:25:55 with santiago  localization Palleschi, Susan M

## 2023-07-13 NOTE — BRIEF OPERATIVE NOTE - NSICDXBRIEFPOSTOP_GEN_ALL_CORE_FT
POST-OP DIAGNOSIS:  Breast cancer of upper-outer quadrant of right female breast 13-Jul-2023 11:26:31  Palleschi, Susan M

## 2023-07-13 NOTE — ASU DISCHARGE PLAN (ADULT/PEDIATRIC) - CARE PROVIDER_API CALL
Palleschi, Susan Christiana  [address], [address]  [place] [address]  Phone: [phone]  Fax: [phone]  Follow Up Time: 2 weeks

## 2023-07-13 NOTE — ASU DISCHARGE PLAN (ADULT/PEDIATRIC) - ASU DC SPECIAL INSTRUCTIONSFT
You have purple surgical glue over your incisions, this will wear away over the next week on its own and helps to keep your incisions closed and clean. You may shower tomorrow but do not scrub away glue. Let water run over surgical incisions and pat dry. You also have black stitches over your core biopsy site, these will be removed in office w/ Dr. Palleschi at follow up appointment.    For pain take Extra Strength Tylenol 500mg 2 tablets every 6 hours as needed for pain    Wear supportive bra day and night for 7-10 days until post op visit. No heavy lifting or gym workout until cleared by surgeon.    Follow up in 2 weeks with Dr. Palleschi, call office to make/confirm appointment

## 2023-07-13 NOTE — ASU DISCHARGE PLAN (ADULT/PEDIATRIC) - NS MD DC FALL RISK RISK
For information on Fall & Injury Prevention, visit: [email or web address] OR  [email or web address] OR  https://www.cdc.gov/steadi/patient.html

## 2023-07-24 ENCOUNTER — APPOINTMENT (OUTPATIENT)
Dept: PLASTIC SURGERY | Facility: CLINIC | Age: 73
End: 2023-07-24
Payer: MEDICARE

## 2023-07-25 ENCOUNTER — NON-APPOINTMENT (OUTPATIENT)
Age: 73
End: 2023-07-25

## 2023-07-25 ENCOUNTER — APPOINTMENT (OUTPATIENT)
Dept: PLASTIC SURGERY | Facility: CLINIC | Age: 73
End: 2023-07-25
Payer: MEDICARE

## 2023-07-25 VITALS
TEMPERATURE: 97.9 F | OXYGEN SATURATION: 97 % | DIASTOLIC BLOOD PRESSURE: 80 MMHG | HEART RATE: 84 BPM | BODY MASS INDEX: 26.5 KG/M2 | WEIGHT: 135 LBS | SYSTOLIC BLOOD PRESSURE: 131 MMHG | HEIGHT: 60 IN

## 2023-07-25 LAB — SURGICAL PATHOLOGY STUDY: SIGNIFICANT CHANGE UP

## 2023-07-25 PROCEDURE — 99024 POSTOP FOLLOW-UP VISIT: CPT

## 2023-07-25 NOTE — HISTORY OF PRESENT ILLNESS
[FreeTextEntry1] : Patient is a 73 year old female here today for a post op visit.\par She has no family history of breast or ovarian cancer. She is relatively healthy.\par 6/1/2023 Bilateral mammogram: ColeenfamiliaSondraeric Lifetime Risk: 3.5%\par No suspicious mass, suspicious microcalcifications, or other sign of malignancy is identified.\par Bilateral breast ultrasound: At right 9:00, 9 cm from the nipple there is a 0.2 x 0.4 x 0.4 cm ill-defined hypoechoic area, indeterminate. Recommend further evaluation with right targeted ultrasound. \par At right 9:00, 8 cm from the nipple there is a stable 0.9 x 0.5 x 0.3 cm lobulated hypoechoic mass, stable since at least 11/4/2020.\par At left 3:00, 12 cm from the nipple there is a 0.8 x 0.4 x 0.6 cm intramammary lymph node. BI-RADS 0. \par 6/9/2023 Right breast ultrasound: \par In the 9:00 location 9 cm from the nipple again visualized is an ill-defined hypoechoic nonmass region measuring 0.5 x 0.7 x 0.6 cm.\par No right axillary lymphadenopathy is appreciated. Recommend US guided core biopsy. BI-RADS 4B. \par 6/15/2023 Right 9:00 (9cmFN) US guided core biopsy: A twirl shaped marking clip was placed. \par Pathology revealed invasive lobular carcinoma, invasive tumor measures at least 6 mm, microcalcifications not detected, lymphovascular permeation by tumor not seen, ID papilloma, 2mm. ER+ 90%/OR+ 70%/Her2- (1+ membrane staining). \par Appropriate surgical and oncologic management is recommended. Breast MRI is also recommended prior to definitive surgical management. These results are concordant. \par 6/27/2023 bilateral breast MRI: Recently diagnosed right breast malignancy associated with the mass and non-mass enhancement measuring up to 1.6 cm.  There is a 1.7 cm linear non-mass enhancement 2.9 cm superior to the index malignancy of the right breast for which a right MRI guided biopsy advised.  No suspicious enhancement left breast.  Nonspecific 8 mm enhancing dermal lesion within the upper outer left breast.  Correlation with clinical exam is recommended.  BI-RADS 4A.\par 6/30/2023 Right MRI guided core biopsy: A buckle shaped marking clip was placed. Pathology revealed: Invasive mammary carcinoma. Tera score 5/9 (2 + 2 + 1) in this limited material. Invasive tumor measures at least 4 mm. Ductal carcinoma in situ is not identified. Microcalcifications are absent. Lymphovascular permeation by tumor is not seen. ER+90%/OR+70%/HER 2-. These results are concordant. Surgical oncologic management recommended. \par 7/13/2023 right upper outer SIMEON  localization x2 wide excision lumpectomy, right axillary deep sentinel lymph node biopsy.\par Pathology revealed right axillary sentinel lymph nodes x 2 benign.  \par Right 9:00 core biopsy scar negative.  \par Right upper outer lumpectomy: \par Tumor 1 revealed infiltrating ductal carcinoma, grade 1, 1.7 cm.  \par Tumor 2 revealed infiltrating lobular carcinoma, grade 2, 1.6 cm size.  Negative margins.  \par Fibroadenoma with lobular carcinoma in situ.  2 intramammary lymph nodes negative.  \par Right 11:00 core biopsy scar mid\par She is here with her daughter Evangelina\par She notes swelling and hardness of the right lower outer breast at the lumpectomy site.  She is wearing a sports bra day and night.

## 2023-07-25 NOTE — CONSULT LETTER
[Dear  ___] : Dear  [unfilled], [Consult Letter:] : I had the pleasure of evaluating your patient, [unfilled]. [Please see my note below.] : Please see my note below. [Sincerely,] : Sincerely, [FreeTextEntry3] : Susan M. Palleschi, MD, FACS\par Division of Breast Surgery\par Director, Breast Surgery\par [facility]\par [address]\par [address]\[place] [address]\par (Phone) [phone]\par (Fax) [phone] n\par [address]\[place] [address]\par (Phone) [phone]\par (Fax) [phone]

## 2023-07-25 NOTE — ASSESSMENT
[FreeTextEntry1] : Right invasive breast cancers x 2.\par Status post right wide excision lumpectomy, right axillary sentinel lymph node biopsy.\par Clinical stage I.\par \par 1.  Pathology report reviewed with the patient and her daughter, copy provided.\par 2.  Medical oncologist: I will refer her to [facility] medical oncology\par 3.  Radiation oncologist: I will refer her to [facility] radiation oncology\par 4. Annual bilateral mammogram and breast ultrasound due 6/2024.\par 5. Follow up office visit 9/2023.\par 6. Advised monthly self breast examinations and advised her to contact me if she has any concerns.

## 2023-07-25 NOTE — PHYSICAL EXAM
[Normocephalic] : normocephalic [Atraumatic] : atraumatic [Supple] : supple [No Supraclavicular Adenopathy] : no supraclavicular adenopathy [Examined in the supine and seated position] : examined in the supine and seated position [No dominant masses] : no dominant masses in right breast  [No Nipple Retraction] : no right nipple retraction [No Nipple Discharge] : no right nipple discharge [No Axillary Lymphadenopathy] : no left axillary lymphadenopathy [No Edema] : no edema [No Rashes] : no rashes [No Ulceration] : no ulceration [de-identified] : s/p right lumpectomy, axillary sentinel LN biopsy.  Right lower outer incision and axillary incision healing well.  Right outer breast core biopsy scar with nylon sutures, removed and Steri-Strips placed.  Palpable right lower outer seroma measuring 10 x 10 cm.  No evidence of infection.

## 2023-07-26 ENCOUNTER — NON-APPOINTMENT (OUTPATIENT)
Age: 73
End: 2023-07-26

## 2023-08-08 ENCOUNTER — OUTPATIENT (OUTPATIENT)
Dept: OUTPATIENT SERVICES | Facility: HOSPITAL | Age: 73
LOS: 1 days | Discharge: HOME | End: 2023-08-08
Payer: MEDICARE

## 2023-08-08 DIAGNOSIS — Z98.890 OTHER SPECIFIED POSTPROCEDURAL STATES: Chronic | ICD-10-CM

## 2023-08-08 DIAGNOSIS — Z90.710 ACQUIRED ABSENCE OF BOTH CERVIX AND UTERUS: Chronic | ICD-10-CM

## 2023-08-08 DIAGNOSIS — Z98.891 HISTORY OF UTERINE SCAR FROM PREVIOUS SURGERY: Chronic | ICD-10-CM

## 2023-08-13 ENCOUNTER — OUTPATIENT (OUTPATIENT)
Dept: OUTPATIENT SERVICES | Facility: HOSPITAL | Age: 73
LOS: 1 days | Discharge: HOME | End: 2023-08-13

## 2023-08-13 DIAGNOSIS — Z98.890 OTHER SPECIFIED POSTPROCEDURAL STATES: Chronic | ICD-10-CM

## 2023-08-13 DIAGNOSIS — Z98.891 HISTORY OF UTERINE SCAR FROM PREVIOUS SURGERY: Chronic | ICD-10-CM

## 2023-08-13 DIAGNOSIS — C50.919 MALIGNANT NEOPLASM OF UNSPECIFIED SITE OF UNSPECIFIED FEMALE BREAST: ICD-10-CM

## 2023-08-13 DIAGNOSIS — Z90.710 ACQUIRED ABSENCE OF BOTH CERVIX AND UTERUS: Chronic | ICD-10-CM

## 2023-08-15 ENCOUNTER — NON-APPOINTMENT (OUTPATIENT)
Age: 73
End: 2023-08-15

## 2023-08-15 ENCOUNTER — APPOINTMENT (OUTPATIENT)
Dept: HEMATOLOGY ONCOLOGY | Facility: CLINIC | Age: 73
End: 2023-08-15
Payer: MEDICARE

## 2023-08-15 ENCOUNTER — APPOINTMENT (OUTPATIENT)
Dept: RADIATION ONCOLOGY | Facility: CLINIC | Age: 73
End: 2023-08-15
Payer: MEDICARE

## 2023-08-15 VITALS
TEMPERATURE: 97.7 F | BODY MASS INDEX: 27.27 KG/M2 | SYSTOLIC BLOOD PRESSURE: 149 MMHG | HEIGHT: 60 IN | OXYGEN SATURATION: 99 % | WEIGHT: 138.89 LBS | HEART RATE: 81 BPM | DIASTOLIC BLOOD PRESSURE: 81 MMHG

## 2023-08-15 VITALS
HEART RATE: 90 BPM | HEIGHT: 58.66 IN | BODY MASS INDEX: 28.38 KG/M2 | WEIGHT: 138.89 LBS | TEMPERATURE: 96.6 F | RESPIRATION RATE: 17 BRPM | OXYGEN SATURATION: 97 % | DIASTOLIC BLOOD PRESSURE: 80 MMHG | SYSTOLIC BLOOD PRESSURE: 142 MMHG

## 2023-08-15 DIAGNOSIS — Z78.9 OTHER SPECIFIED HEALTH STATUS: ICD-10-CM

## 2023-08-15 DIAGNOSIS — Z85.3 PERSONAL HISTORY OF MALIGNANT NEOPLASM OF BREAST: ICD-10-CM

## 2023-08-15 DIAGNOSIS — Z23 ENCOUNTER FOR IMMUNIZATION: ICD-10-CM

## 2023-08-15 DIAGNOSIS — Z87.891 PERSONAL HISTORY OF NICOTINE DEPENDENCE: ICD-10-CM

## 2023-08-15 DIAGNOSIS — Z87.898 PERSONAL HISTORY OF OTHER SPECIFIED CONDITIONS: ICD-10-CM

## 2023-08-15 DIAGNOSIS — Z63.4 DISAPPEARANCE AND DEATH OF FAMILY MEMBER: ICD-10-CM

## 2023-08-15 DIAGNOSIS — R92.8 OTHER ABNORMAL AND INCONCLUSIVE FINDINGS ON DIAGNOSTIC IMAGING OF BREAST: ICD-10-CM

## 2023-08-15 DIAGNOSIS — H35.30 UNSPECIFIED MACULAR DEGENERATION: ICD-10-CM

## 2023-08-15 PROCEDURE — 99204 OFFICE O/P NEW MOD 45 MIN: CPT | Mod: 25

## 2023-08-15 PROCEDURE — 99205 OFFICE O/P NEW HI 60 MIN: CPT

## 2023-08-15 PROCEDURE — 77262 THER RADIOLOGY TX PLNG INTRM: CPT

## 2023-08-15 RX ORDER — CHOLECALCIFEROL (VITAMIN D3) 25 MCG
TABLET ORAL
Refills: 0 | Status: ACTIVE | COMMUNITY

## 2023-08-15 RX ORDER — MULTIVIT-MIN/IRON/FOLIC ACID/K 18-600-40
CAPSULE ORAL
Refills: 0 | Status: ACTIVE | COMMUNITY

## 2023-08-15 RX ORDER — UBIDECARENONE 50 MG
TABLET ORAL
Refills: 0 | Status: ACTIVE | COMMUNITY

## 2023-08-15 RX ORDER — HYDROCORTISONE 25 MG/G
2.5 OINTMENT TOPICAL TWICE DAILY
Qty: 1 | Refills: 0 | Status: DISCONTINUED | COMMUNITY
Start: 2022-06-13 | End: 2023-08-15

## 2023-08-15 RX ORDER — MV-MN/LUTEIN/ZEAX/BILBER/HB277 5-1-7.5 MG
CAPSULE ORAL
Refills: 0 | Status: ACTIVE | COMMUNITY

## 2023-08-15 RX ORDER — BIOTIN 10 MG
TABLET ORAL
Refills: 0 | Status: ACTIVE | COMMUNITY

## 2023-08-15 RX ORDER — BERBERINE CHLOR/SEAWEED/CHROM 500-250 MG
CAPSULE ORAL
Refills: 0 | Status: ACTIVE | COMMUNITY

## 2023-08-15 SDOH — SOCIAL STABILITY - SOCIAL INSECURITY: DISSAPEARANCE AND DEATH OF FAMILY MEMBER: Z63.4

## 2023-08-15 NOTE — REASON FOR VISIT
[Initial Consultation] : an initial consultation [Other: _____] : [unfilled] [FreeTextEntry2] : Breast Cancer

## 2023-08-15 NOTE — VITALS
[Maximal Pain Intensity: 0/10] : 0/10 [Least Pain Intensity: 0/10] : 0/10 [NoTreatment Scheduled] : no treatment scheduled [90: Able to carry normal activity; minor signs or symptoms of disease.] : 90: Able to carry normal activity; minor signs or symptoms of disease.  [ECOG Performance Status: 0 - Fully active, able to carry on all pre-disease performance without restriction] : Performance Status: 0 - Fully active, able to carry on all pre-disease performance without restriction [Date: ____________] : Patient's last distress assessment performed on [unfilled]. [2 - Distress Level] : Distress Level: 2 [Patient given social work contact information and resource sheet] : Patient was given social work contact information and resource sheet

## 2023-08-15 NOTE — CONSULT LETTER
[Dear  ___] : Dear  [unfilled], [( Thank you for referring [unfilled] for consultation for _____ )] : Thank you for referring [unfilled] for consultation for [unfilled] [Please see my note below.] : Please see my note below. [Consult Closing:] : Thank you very much for allowing me to participate in the care of this patient.  If you have any questions, please do not hesitate to contact me. [Sincerely,] : Sincerely, [DrTory  ___] : Dr. CARLISLE [FreeTextEntry2] : Susan Palleschi,  [facility] [address] [place] [address] [FreeTextEntry3] : Yola Medrano MD Associate Chief  EM[facility] [place]  of Medicine [facility]  [DrTory ___] : Dr. CARLISLE

## 2023-08-15 NOTE — HISTORY OF PRESENT ILLNESS
[Disease: _____________________] : Disease: [unfilled] [T: ___] : T[unfilled] [N: ___] : N[unfilled] [M: ___] : M[unfilled] [AJCC Stage: ____] : AJCC Stage: [unfilled] [FreeTextEntry1] : Right breast cancer diagnosed at the age of 73 in 06/2023 6/1/23 Bilateral mammogram: No suspicious mass, suspicious microcalcifications, or other sign of malignancy is identified. 6/1/23 Bilateral breast ultrasound: At right 9:00, 9 cm from the nipple there is a 0.2 x 0.4 x 0.4 cm ill-defined hypoechoic area, indeterminate. At right 9:00, 8 cm from the nipple there is a stable 0.9 x 0.5 x 0.3 cm lobulated hypoechoic mass, stable since at least 11/4/2020. At left 3:00, 12 cm from the nipple there is a 0.8 x 0.4 x 0.6 cm intramammary lymph node. BI-RADS 0.  6/9/23 Right breast ultrasound:  In the 9:00 location 9 cm from the nipple again visualized is an ill-defined hypoechoic Non mass region measuring 0.5 x 0.7 x 0.6 cm. No right axillary lymphadenopathy is appreciated. Recommend US guided core biopsy. BI-RADS 4B.  6/15/23 Right 9:00 (9cmFN) US guided core biopsy: A twirl shaped marking clip was placed.  Pathology revealed invasive lobular carcinoma, invasive tumor measures at least 6 mm, microcalcifications not detected, lymph vascular permeation by tumor not seen, ID papilloma, 2mm. ER+90%/[place] 70%/Her2- (1+ ) 6/27/23 Bilateral breast MRI: Recently diagnosed right breast malignancy associated with the mass and non-mass enhancement measuring up to 1.6 cm.  There is a 1.7 cm linear non-mass enhancement 2.9 cm superior to the index malignancy of the right breast for which a right MRI guided biopsy advised.  No suspicious enhancement left breast.  Nonspecific 8 mm enhancing dermal lesion within the upper outer left breast.  Correlation with clinical exam is recommended.  BI-RADS 4A. 6/30/23 Right MRI guided core biopsy: A buckle shaped marking clip was placed. Pathology revealed: Invasive mammary carcinoma. Granger score 5/9 (2 + 2 + 1) in this limited material. Invasive tumor measures at least 4 mm. Ductal carcinoma in situ is not identified. Microcalcifications are absent. Lymph vascular permeation by tumor is not seen. ER90%, PR70%, and HER 2-.  7/13/23 Right upper outer localization x2 wide excision lumpectomy, right axillary deep sentinel lymph node biopsy: Multifocal  A 1.7 cm IDC SBR 5/9 and a 1.6 cm ILC, SBR 6/9 Fibroadenoma 1.2 cm  with lobular carcinoma in situ.  2 intramammary lymph nodes negative.     [de-identified] : Right breast cancer diagnosed at age 73  6/1/23 Bilateral mammogram: No suspicious mass, suspicious microcalcifications, or other sign of malignancy is identified. 6/1/23 Bilateral breast ultrasound: At right 9:00, 9 cm from the nipple there is a 0.2 x 0.4 x 0.4 cm ill-defined hypoechoic area, indeterminate. At right 9:00, 8 cm from the nipple there is a stable 0.9 x 0.5 x 0.3 cm lobulated hypoechoic mass, stable since at least 11/4/2020. At left 3:00, 12 cm from the nipple there is a 0.8 x 0.4 x 0.6 cm intramammary lymph node. BI-RADS 0. 6/9/23 Right breast ultrasound: In the 9:00 location 9 cm from the nipple again visualized is an ill-defined hypoechoic Non mass region measuring 0.5 x 0.7 x 0.6 cm. No right axillary lymphadenopathy is appreciated. Recommend US guided core biopsy. BI-RADS 4B. 6/15/23 Right 9:00 (9cmFN) US guided core biopsy: A twirl shaped marking clip was placed. Pathology revealed invasive lobular carcinoma, lymph vascular permeation by tumor not seen. A 0.2 cm Intraductal papilloma. ER 90%, RI 70%, HER-2 negative (1+). 6/27/23 Bilateral breast MRI: Recently diagnosed right breast malignancy associated with the mass and non-mass enhancement measuring up to 1.6 cm. There is a 1.7 cm linear non-mass enhancement 2.9 cm superior to the index malignancy of the right breast for which a right MRI guided biopsy advised. No suspicious enhancement left breast. Nonspecific 8 mm enhancing dermal lesion within the upper outer left breast. Correlation with clinical exam is recommended. BI-RADS 4A. 6/30/23 Right MRI guided core biopsy Pathology revealed: Invasive mammary carcinoma. Tera score 5/9 (2 + 2 + 1) in this limited material. Invasive tumor measures at least 4 mm. Ductal carcinoma in situ is not identified. Microcalcifications are absent. Lymph vascular permeation by tumor is not seen. ER>90%, RI 40-50%, HER-2 (0) 7/13/23 Right upper outer breast localization x 2 with wide excision lumpectomy, right axillary deep sentinel lymph node biopsy: Multifocal invasive carcinoma A 1.7 cm IDC SBR 5/9 and a 1.6 cm ILC, SBR 6/9, Fibroadenoma 1.2 cm with lobular carcinoma in situ.  0/2 SLN negative and 2 intramammary lymph nodes negative. [de-identified] :  1.7 cm IDC, SBR 5/9 and 1.6 cm ILC, SBR 6/9, 0/2 SLN and 0/2 intramammary LN; ILC: ER >90%, [place] 70%, HER-2 (1+); IDC ER>90%, [place] 40-50%, HER-2 (0) [de-identified] : Ashley comes with her daughter to discuss the medical management of her breast cancer.   HEALTH MAINTENANCE: PCP: Dr. Brittany Yates Endocrinologist: Dr. Sari Boss  GYN: none Mammogram and sonogram: 6/1/23 Pap smear: not current Bone density DEXA scan: 11/25/22 showed T scores of -1.2 in spine, -2.6 in femoral neck and -2.0 in total hip Colonoscopy: 8/2022 with no polyps

## 2023-08-17 ENCOUNTER — NON-APPOINTMENT (OUTPATIENT)
Age: 73
End: 2023-08-17

## 2023-08-17 PROCEDURE — 77290 THER RAD SIMULAJ FIELD CPLX: CPT | Mod: 26

## 2023-08-17 PROCEDURE — 77333 RADIATION TREATMENT AID(S): CPT | Mod: 26

## 2023-08-18 NOTE — HISTORY OF PRESENT ILLNESS
[FreeTextEntry1] : Ms. Ramírez is a 73-year-old female here for consultation of radiotherapy.  She is accompanied by her daughter for today's visit.   Diagnosis: Stage I [id number], RIGHT Breast, 1.7 cm IDC, SBR 5/9 and 1.6 cm ILC, SBR 6/9, Total of four lymph nodes negative (2 sentinel nodes and 2 intramammary lymph nodes), ER + (>90%), [place] +(70%) and HER-2 negative   HPI :  6/1/2023 Bilateral mammogram: No suspicious mass, suspicious microcalcifications, or other sign of malignancy is identified. Bilateral breast ultrasound: At right 9:00, 9 cm from the nipple there is a 0.2 x 0.4 x 0.4 cm ill-defined hypoechoic area, indeterminate. Recommend further evaluation with right targeted ultrasound. At right 9:00, 8 cm from the nipple there is a stable 0.9 x 0.5 x 0.3 cm lobulated hypoechoic mass, stable since at least 11/4/2020. At left 3:00, 12 cm from the nipple there is a 0.8 x 0.4 x 0.6 cm intramammary lymph node. BI-RADS 0.  6/9/2023 Right breast ultrasound: In the 9:00 location 9 cm from the nipple again visualized is an ill-defined hypoechoic nonmass region measuring 0.5 x 0.7 x 0.6 cm. No right axillary lymphadenopathy is appreciated. Recommend US guided core biopsy. BI-RADS 4B.  6/15/2023 Right 9:00 (9cmFN) US guided core biopsy: A twirl shaped marking clip was placed. Pathology revealed invasive lobular carcinoma, invasive tumor measures at least 6 mm, microcalcifications not detected, lymphovascular permeation by tumor not seen, ID papilloma, 2mm. ER+ 90%/[place]+ 70%/Her2- (1+ membrane staining).  6/27/2023 bilateral breast MRI: Recently diagnosed right breast malignancy associated with the mass and non-mass enhancement measuring up to 1.6 cm. There is a 1.7 cm linear non-mass enhancement 2.9 cm superior to the index malignancy of the right breast for which a right MRI guided biopsy advised. No suspicious enhancement left breast. Nonspecific 8 mm enhancing dermal lesion within the upper outer left breast. Correlation with clinical exam is recommended. BI-RADS 4A.  6/30/2023 Right MRI guided core biopsy: A buckle shaped marking clip was placed. Pathology revealed: Invasive mammary carcinoma. Keene score 5/9 (2 + 2 + 1) in this limited material. Invasive tumor measures at least 4 mm. Ductal carcinoma in situ is not identified. Microcalcifications are absent. Lymphovascular permeation by tumor is not seen. ER+90%/[place]+70%/HER 2-.   7/13/2023 right upper outer SIMEON  localization x2 wide excision lumpectomy and right axillary deep sentinel lymph node biopsy. Right upper outer lumpectomy: 2 foci of invasive carcinoma, tumor 1 is IDC, grade 1, 1.7 cm.  Tumor 2 is ILC, grade 2, 1.6 cm size. LCIS present, Negative margins. IDC is 2mm from anterior margin and ILC is 2 mm from the posterior margin. 4 LN examined and all negative for tumor (2 sentinel and 2 non-sentinel) . Stage I  pT1c pN0(sn) Mx  8/15/23 - presents in consultation for discussion of radiation therapy options.  Ms. Ramírez is feeling well today and has healed from her surgery. She looks forward to next steps in her treatment.  She is also scheduled to see Dr. Medrano ([facility]) today in consultation.

## 2023-08-18 NOTE — PHYSICAL EXAM
[Sclera] : the sclera and conjunctiva were normal [Extraocular Movements] : extraocular movements were intact [Outer Ear] : the ears and nose were normal in appearance [Hearing Threshold Finger Rub Not [place]] : hearing was normal [Heart Rate And Rhythm] : heart rate and rhythm were normal [Arterial Pulses Normal] : the arterial pulses were normal [Breast Abnormal Lactation (Galactorrhea)] : no nipple discharge [No UE Edema] : there is no upper extremity edema [Abdomen Soft] : soft [Abdomen Tenderness] : non-tender [Normal] : oriented to person, place and time, the affect was normal, the mood was normal and not anxious [de-identified] : lumpectomy incision healed well, mild seroma under incision

## 2023-08-18 NOTE — OB/GYN HISTORY
[Currently In Menopause] : currently in menopause [___] : Total Pregnancies: [unfilled] [Experiencing Menopausal Sxs] : not experiencing menopausal symptoms

## 2023-08-18 NOTE — REVIEW OF SYSTEMS
[Patient Intake Form Reviewed] : Patient intake form was reviewed [Negative] : Heme/Lymph [FreeTextEntry3] : right eye macular degeneration [de-identified] : healed surgical scar right breast/axilla

## 2023-08-24 ENCOUNTER — APPOINTMENT (OUTPATIENT)
Dept: ENDOCRINOLOGY | Facility: CLINIC | Age: 73
End: 2023-08-24

## 2023-08-24 PROCEDURE — 77300 RADIATION THERAPY DOSE PLAN: CPT | Mod: 26

## 2023-08-24 PROCEDURE — 77295 3-D RADIOTHERAPY PLAN: CPT | Mod: 26

## 2023-08-24 PROCEDURE — 77334 RADIATION TREATMENT AID(S): CPT | Mod: 26

## 2023-08-28 ENCOUNTER — APPOINTMENT (OUTPATIENT)
Dept: ENDOCRINOLOGY | Facility: CLINIC | Age: 73
End: 2023-08-28
Payer: MEDICARE

## 2023-08-28 ENCOUNTER — MED ADMIN CHARGE (OUTPATIENT)
Age: 73
End: 2023-08-28

## 2023-08-28 VITALS
HEIGHT: 58 IN | WEIGHT: 136 LBS | OXYGEN SATURATION: 99 % | DIASTOLIC BLOOD PRESSURE: 72 MMHG | BODY MASS INDEX: 28.55 KG/M2 | HEART RATE: 82 BPM | SYSTOLIC BLOOD PRESSURE: 124 MMHG

## 2023-08-28 PROCEDURE — 96372 THER/PROPH/DIAG INJ SC/IM: CPT

## 2023-08-28 RX ADMIN — DENOSUMAB 60 MG/ML: 60 INJECTION SUBCUTANEOUS at 00:00

## 2023-08-29 RX ORDER — DENOSUMAB 60 MG/ML
60 INJECTION SUBCUTANEOUS
Qty: 1 | Refills: 0 | Status: COMPLETED | OUTPATIENT
Start: 2023-08-28

## 2023-08-30 PROCEDURE — 77280 THER RAD SIMULAJ FIELD SMPL: CPT | Mod: 26

## 2023-08-31 PROCEDURE — 77387C: CUSTOM

## 2023-09-01 PROCEDURE — 77387C: CUSTOM

## 2023-09-05 ENCOUNTER — NON-APPOINTMENT (OUTPATIENT)
Age: 73
End: 2023-09-05

## 2023-09-05 DIAGNOSIS — Z92.3 PERSONAL HISTORY OF IRRADIATION: ICD-10-CM

## 2023-09-05 PROCEDURE — 77387C: CUSTOM

## 2023-09-05 NOTE — DISEASE MANAGEMENT
[Pathological] : TNM Stage: p [I] : I [TTNM] : 1c [NTNM] : 0 [MTNM] : 0 [de-identified] : [id number] [de-identified] : [id number] [de-identified] : RIGHT Breast

## 2023-09-05 NOTE — HISTORY OF PRESENT ILLNESS
[FreeTextEntry1] : Ms. Ramírez is a 73-year-old female who presents for an on treatment visit.   Diagnosis: Stage I [id number], RIGHT Breast, 1.7 cm IDC, SBR 5/9 and 1.6 cm ILC, SBR 6/9, Total of four lymph nodes negative (2 sentinel nodes and 2 intramammary lymph nodes), ER + (>90%), ND +(70%) and HER-2 negative  Oncotype DX Recurrence Score: 22 (second result still pending)  HPI :  6/1/2023 Bilateral mammogram: No suspicious mass, suspicious microcalcifications, or other sign of malignancy is identified. Bilateral breast ultrasound: At right 9:00, 9 cm from the nipple there is a 0.2 x 0.4 x 0.4 cm ill-defined hypoechoic area, indeterminate. Recommend further evaluation with right targeted ultrasound. At right 9:00, 8 cm from the nipple there is a stable 0.9 x 0.5 x 0.3 cm lobulated hypoechoic mass, stable since at least 11/4/2020. At left 3:00, 12 cm from the nipple there is a 0.8 x 0.4 x 0.6 cm intramammary lymph node. BI-RADS 0.  6/9/2023 Right breast ultrasound: In the 9:00 location 9 cm from the nipple again visualized is an ill-defined hypoechoic nonmass region measuring 0.5 x 0.7 x 0.6 cm. No right axillary lymphadenopathy is appreciated. Recommend US guided core biopsy. BI-RADS 4B.  6/15/2023 Right 9:00 (9cmFN) US guided core biopsy: A twirl shaped marking clip was placed. Pathology revealed invasive lobular carcinoma, invasive tumor measures at least 6 mm, microcalcifications not detected, lymphovascular permeation by tumor not seen, ID papilloma, 2mm. ER+ 90%/ND+ 70%/Her2- (1+ membrane staining).  6/27/2023 bilateral breast MRI: Recently diagnosed right breast malignancy associated with the mass and non-mass enhancement measuring up to 1.6 cm. There is a 1.7 cm linear non-mass enhancement 2.9 cm superior to the index malignancy of the right breast for which a right MRI guided biopsy advised. No suspicious enhancement left breast. Nonspecific 8 mm enhancing dermal lesion within the upper outer left breast. Correlation with clinical exam is recommended. BI-RADS 4A.  6/30/2023 Right MRI guided core biopsy: A buckle shaped marking clip was placed. Pathology revealed: Invasive mammary carcinoma. [place] score 5/9 (2 + 2 + 1) in this limited material. Invasive tumor measures at least 4 mm. Ductal carcinoma in situ is not identified. Microcalcifications are absent. Lymphovascular permeation by tumor is not seen. ER+90%/ND+70%/HER 2-.   7/13/2023 right upper outer SIMEON  localization x2 wide excision lumpectomy and right axillary deep sentinel lymph node biopsy. Right upper outer lumpectomy: 2 foci of invasive carcinoma, tumor 1 is IDC, grade 1, 1.7 cm.  Tumor 2 is ILC, grade 2, 1.6 cm size. LCIS present, Negative margins. IDC is 2mm from anterior margin and ILC is 2 mm from the posterior margin. 4 LN examined and all negative for tumor (2 sentinel and 2 non-sentinel). Stage I pT1c pN0(sn) Mx Oncotype DX Recurrence Score: 22   8/15/23 - presented in consultation for discussion of radiation therapy options.  Ms. Ramírez is feeling well today and has healed from her surgery. She looks forward to next steps in her treatment.  She is also scheduled to see Dr. Medrano ([facility]) today in consultation.  We discussed options in regard to adjuvant radiation, including omission of radiation, partial breast and whole breast radiation including moderate hypofractionation and ultrahypofractionation. She has opted for moderate hypofractionation to the whole breast. We have discussed the common and rarer acute and late side effects of adjuvant breast radiation as well of logistics of treatment, skin care and wellness during treatment. She has had written information to take away and has signed the consent form for treatment. She will return for simulation after discussion with Dr. Medrano today.  8/15/23 - saw Dr. Medrano ([facility]) in consultation. Although both tumors were ER/ND positive, and HER-2 negative an Oncotype DX assay is indicated for further evaluation to confirm that there is no predicted benefit from chemotherapy. She will be contacted as soon as the Oncotype results come back to confirm the plan. If the Oncotype score is over 25 she will be offered 8 cycles of CMF chemotherapy which we discussed very briefly. She also saw Dr. Louise in Radiation Medicine today to discuss the role of adjuvant radiation and is scheduled for simulation later this week. After she completes radiation therapy, she should start endocrine therapy with an aromatase inhibitor.  8/31/23 - started RADIATION Therapy to RIGHT Breast, 4240 cGy in 16 fx  9/5/23 - OTV 3/16 fx completed. Ms. Ramírez is feeling well and is tolerating treatment without any problems so far.  Reinforced what to expect with radiation, possible side effects as well as importance of skin care.

## 2023-09-05 NOTE — VITALS
[NoTreatment Scheduled] : no treatment scheduled [90: Able to carry normal activity; minor signs or symptoms of disease.] : 90: Able to carry normal activity; minor signs or symptoms of disease.  [ECOG Performance Status: 0 - Fully active, able to carry on all pre-disease performance without restriction] : Performance Status: 0 - Fully active, able to carry on all pre-disease performance without restriction [Maximal Pain Intensity: 0/10] : 0/10 [Least Pain Intensity: 0/10] : 0/10

## 2023-09-05 NOTE — REVIEW OF SYSTEMS
[Fatigue: Grade 0] : Fatigue: Grade 0 [Breast Pain: Grade 0] : Breast Pain: Grade 0 [Pruritus: Grade 0] : Pruritus: Grade 0 [Skin Hyperpigmentation: Grade 0] : Skin Hyperpigmentation: Grade 0 [Dermatitis Radiation: Grade 0] : Dermatitis Radiation: Grade 0 [FreeTextEntry4] : to moisturize at least twice a day

## 2023-09-06 PROCEDURE — 77387C: CUSTOM

## 2023-09-07 ENCOUNTER — APPOINTMENT (OUTPATIENT)
Dept: ENDOCRINOLOGY | Facility: CLINIC | Age: 73
End: 2023-09-07

## 2023-09-07 PROCEDURE — 77427 RADIATION TX MANAGEMENT X5: CPT

## 2023-09-07 PROCEDURE — 77387C: CUSTOM

## 2023-09-08 LAB
25(OH)D3 SERPL-MCNC: 42.9 NG/ML
ALBUMIN SERPL ELPH-MCNC: 4.7 G/DL
ALP BLD-CCNC: 91 U/L
ALT SERPL-CCNC: 14 U/L
ANION GAP SERPL CALC-SCNC: 13 MMOL/L
AST SERPL-CCNC: 20 U/L
BILIRUB SERPL-MCNC: 0.4 MG/DL
BUN SERPL-MCNC: 13 MG/DL
CALCIUM SERPL-MCNC: 10.7 MG/DL
CHLORIDE SERPL-SCNC: 102 MMOL/L
CO2 SERPL-SCNC: 25 MMOL/L
COLLAGEN CTX SERPL-MCNC: 291 PG/ML
COLLAGEN NTX UR-SCNC: 93 NMOL BCE
COLLAGEN NTX/CREAT UR-SRTO: 25
CREAT SERPL-MCNC: 0.8 MG/DL
CREAT UR-MCNC: 41.6 MG/DL
EGFR: 78 ML/MIN/1.73M2
GLUCOSE SERPL-MCNC: 97 MG/DL
INTERPRETIVE GUIDE:: NORMAL
POTASSIUM SERPL-SCNC: 5.5 MMOL/L
PROT SERPL-MCNC: 7.6 G/DL
SODIUM SERPL-SCNC: 140 MMOL/L

## 2023-09-08 PROCEDURE — 77387C: CUSTOM

## 2023-09-10 ENCOUNTER — NON-APPOINTMENT (OUTPATIENT)
Age: 73
End: 2023-09-10

## 2023-09-11 ENCOUNTER — NON-APPOINTMENT (OUTPATIENT)
Age: 73
End: 2023-09-11

## 2023-09-11 PROCEDURE — 77387C: CUSTOM

## 2023-09-12 PROCEDURE — 77387C: CUSTOM

## 2023-09-13 PROCEDURE — 77387C: CUSTOM

## 2023-09-14 PROCEDURE — 77387C: CUSTOM

## 2023-09-14 PROCEDURE — 77427 RADIATION TX MANAGEMENT X5: CPT

## 2023-09-15 PROCEDURE — 77387C: CUSTOM

## 2023-09-18 ENCOUNTER — NON-APPOINTMENT (OUTPATIENT)
Age: 73
End: 2023-09-18

## 2023-09-18 PROCEDURE — 77387C: CUSTOM

## 2023-09-19 PROCEDURE — 77387C: CUSTOM

## 2023-09-20 PROCEDURE — 77387C: CUSTOM

## 2023-09-20 PROCEDURE — 77427 RADIATION TX MANAGEMENT X5: CPT

## 2023-09-21 PROCEDURE — 77387C: CUSTOM

## 2023-09-22 PROCEDURE — 77387C: CUSTOM

## 2023-09-26 ENCOUNTER — APPOINTMENT (OUTPATIENT)
Dept: ENDOCRINOLOGY | Facility: CLINIC | Age: 73
End: 2023-09-26
Payer: MEDICARE

## 2023-09-26 VITALS
SYSTOLIC BLOOD PRESSURE: 122 MMHG | DIASTOLIC BLOOD PRESSURE: 73 MMHG | HEIGHT: 58 IN | HEART RATE: 80 BPM | BODY MASS INDEX: 28.55 KG/M2 | OXYGEN SATURATION: 100 % | WEIGHT: 136 LBS

## 2023-09-26 PROCEDURE — 99214 OFFICE O/P EST MOD 30 MIN: CPT

## 2023-09-27 ENCOUNTER — APPOINTMENT (OUTPATIENT)
Dept: PLASTIC SURGERY | Facility: CLINIC | Age: 73
End: 2023-09-27
Payer: MEDICARE

## 2023-09-27 VITALS
DIASTOLIC BLOOD PRESSURE: 73 MMHG | BODY MASS INDEX: 27.21 KG/M2 | OXYGEN SATURATION: 98 % | WEIGHT: 135 LBS | TEMPERATURE: 97.6 F | HEART RATE: 86 BPM | HEIGHT: 59 IN | SYSTOLIC BLOOD PRESSURE: 128 MMHG

## 2023-09-27 PROCEDURE — 99024 POSTOP FOLLOW-UP VISIT: CPT

## 2023-09-29 ENCOUNTER — NON-APPOINTMENT (OUTPATIENT)
Age: 73
End: 2023-09-29

## 2023-10-01 PROBLEM — Z92.3 HISTORY OF RADIATION THERAPY: Status: RESOLVED | Noted: 2023-09-01 | Resolved: 2023-10-01

## 2023-10-04 ENCOUNTER — NON-APPOINTMENT (OUTPATIENT)
Age: 73
End: 2023-10-04

## 2023-10-23 ENCOUNTER — APPOINTMENT (OUTPATIENT)
Dept: RADIATION ONCOLOGY | Facility: CLINIC | Age: 73
End: 2023-10-23
Payer: MEDICARE

## 2023-10-23 VITALS
SYSTOLIC BLOOD PRESSURE: 129 MMHG | TEMPERATURE: 97.7 F | HEIGHT: 59 IN | WEIGHT: 130.62 LBS | BODY MASS INDEX: 26.33 KG/M2 | RESPIRATION RATE: 17 BRPM | OXYGEN SATURATION: 100 % | DIASTOLIC BLOOD PRESSURE: 75 MMHG | HEART RATE: 79 BPM

## 2023-10-23 PROCEDURE — 99024 POSTOP FOLLOW-UP VISIT: CPT

## 2023-12-26 ENCOUNTER — OUTPATIENT (OUTPATIENT)
Dept: OUTPATIENT SERVICES | Facility: HOSPITAL | Age: 73
LOS: 1 days | Discharge: HOME | End: 2023-12-26

## 2023-12-26 DIAGNOSIS — C50.919 MALIGNANT NEOPLASM OF UNSPECIFIED SITE OF UNSPECIFIED FEMALE BREAST: ICD-10-CM

## 2023-12-26 DIAGNOSIS — Z98.890 OTHER SPECIFIED POSTPROCEDURAL STATES: Chronic | ICD-10-CM

## 2023-12-26 DIAGNOSIS — Z90.710 ACQUIRED ABSENCE OF BOTH CERVIX AND UTERUS: Chronic | ICD-10-CM

## 2023-12-26 DIAGNOSIS — Z98.891 HISTORY OF UTERINE SCAR FROM PREVIOUS SURGERY: Chronic | ICD-10-CM

## 2024-01-09 ENCOUNTER — APPOINTMENT (OUTPATIENT)
Dept: HEMATOLOGY ONCOLOGY | Facility: CLINIC | Age: 74
End: 2024-01-09
Payer: MEDICARE

## 2024-01-09 VITALS
BODY MASS INDEX: 25.55 KG/M2 | HEART RATE: 83 BPM | RESPIRATION RATE: 16 BRPM | OXYGEN SATURATION: 97 % | SYSTOLIC BLOOD PRESSURE: 133 MMHG | DIASTOLIC BLOOD PRESSURE: 79 MMHG | TEMPERATURE: 97 F | WEIGHT: 126.5 LBS

## 2024-01-09 PROCEDURE — 99214 OFFICE O/P EST MOD 30 MIN: CPT

## 2024-01-09 RX ORDER — ANASTROZOLE TABLETS 1 MG/1
1 TABLET ORAL
Qty: 90 | Refills: 1 | Status: ACTIVE | COMMUNITY
Start: 2023-08-15 | End: 1900-01-01

## 2024-01-10 ENCOUNTER — APPOINTMENT (OUTPATIENT)
Dept: PLASTIC SURGERY | Facility: CLINIC | Age: 74
End: 2024-01-10
Payer: MEDICARE

## 2024-01-10 VITALS
HEART RATE: 96 BPM | OXYGEN SATURATION: 96 % | SYSTOLIC BLOOD PRESSURE: 137 MMHG | DIASTOLIC BLOOD PRESSURE: 79 MMHG | BODY MASS INDEX: 24.74 KG/M2 | TEMPERATURE: 97.6 F | WEIGHT: 126 LBS | HEIGHT: 60 IN

## 2024-01-10 DIAGNOSIS — C50.811 MALIGNANT NEOPLASM OF OVERLAPPING SITES OF RIGHT FEMALE BREAST: ICD-10-CM

## 2024-01-10 DIAGNOSIS — Z17.0 MALIGNANT NEOPLASM OF OVERLAPPING SITES OF RIGHT FEMALE BREAST: ICD-10-CM

## 2024-01-10 PROCEDURE — 99213 OFFICE O/P EST LOW 20 MIN: CPT

## 2024-01-10 NOTE — CONSULT LETTER
[Dear  ___] : Dear  [unfilled], [Courtesy Letter:] : I had the pleasure of seeing your patient, [unfilled], in my office today. [Please see my note below.] : Please see my note below. [Sincerely,] : Sincerely, [DrTory  ___] : Dr. CARLISLE [DrTory ___] : Dr. CARLISLE [FreeTextEntry3] : Sharda Deleon, RPA-C Breast Surgery [address] [place] [address], [place] [address] (Phone) [phone] (Fax) [phone]

## 2024-01-10 NOTE — HISTORY OF PRESENT ILLNESS
[FreeTextEntry1] : Patient is a 73 year old female here today for a post op visit. She has no family history of breast or ovarian cancer. She is relatively healthy. 6/1/2023 Bilateral mammogram: Tyrer-Cuzick Lifetime Risk: 3.5% No suspicious mass, suspicious microcalcifications, or other sign of malignancy is identified. Bilateral breast ultrasound: At right 9:00, 9 cm from the nipple there is a 0.2 x 0.4 x 0.4 cm ill-defined hypoechoic area, indeterminate. Recommend further evaluation with right targeted ultrasound.  At right 9:00, 8 cm from the nipple there is a stable 0.9 x 0.5 x 0.3 cm lobulated hypoechoic mass, stable since at least 11/4/2020. At left 3:00, 12 cm from the nipple there is a 0.8 x 0.4 x 0.6 cm intramammary lymph node. BI-RADS 0.  6/9/2023 Right breast ultrasound:  In the 9:00 location 9 cm from the nipple again visualized is an ill-defined hypoechoic nonmass region measuring 0.5 x 0.7 x 0.6 cm. No right axillary lymphadenopathy is appreciated. Recommend US guided core biopsy. BI-RADS 4B.  6/15/2023 Right 9:00 (9cmFN) US guided core biopsy: A twirl shaped marking clip was placed.  Pathology revealed invasive lobular carcinoma, invasive tumor measures at least 6 mm, microcalcifications not detected, lymphovascular permeation by tumor not seen, ID papilloma, 2mm. ER+ 90%/IL+ 70%/Her2- (1+ membrane staining).  Appropriate surgical and oncologic management is recommended. Breast MRI is also recommended prior to definitive surgical management. These results are concordant.  6/27/2023 bilateral breast MRI: Recently diagnosed right breast malignancy associated with the mass and non-mass enhancement measuring up to 1.6 cm.  There is a 1.7 cm linear non-mass enhancement 2.9 cm superior to the index malignancy of the right breast for which a right MRI guided biopsy advised.  No suspicious enhancement left breast.  Nonspecific 8 mm enhancing dermal lesion within the upper outer left breast.  Correlation with clinical exam is recommended.  BI-RADS 4A. 6/30/2023 Right MRI guided core biopsy: A buckle shaped marking clip was placed. Pathology revealed: Invasive mammary carcinoma. [place]era score 5/9 (2 + 2 + 1) in this limited material. Invasive tumor measures at least 4 mm. Ductal carcinoma in situ is not identified. Microcalcifications are absent. Lymphovascular permeation by tumor is not seen. ER+90%/IL+70%/HER 2-. These results are concordant. Surgical oncologic management recommended.  7/13/2023 right upper outer SIMEON  localization x2 wide excision lumpectomy, right axillary deep sentinel lymph node biopsy. Pathology revealed right axillary sentinel lymph nodes x 2 benign.   Right 9:00 core biopsy scar negative.   Right upper outer lumpectomy:  Tumor 1 revealed infiltrating ductal carcinoma, grade 1, 1.7 cm.   Tumor 2 revealed infiltrating lobular carcinoma, grade 2, 1.6 cm size.  Negative margins.   Fibroadenoma with lobular carcinoma in situ.  2 intramammary lymph nodes negative.   Right 11:00 core biopsy scar mid Oncotype DX Recurrence Score: 22 Oncotype DX Recurrence Score: 3 Rad ONC: Dr. Louise, completed XRT 9/22/2023. Saw her for follow up 10/23/2023 Med ONC: Dr. Medrano, she started Anastrozole 11/18/2023 and saw her for follow up yesterday. Up to date with MDs  She denies any current breast concerns  Ultram, Digoxin, folic acid, Lasix, Humalog sliding scale, Zestril, Trental

## 2024-01-10 NOTE — ASSESSMENT
[FreeTextEntry1] : Right invasive breast cancers x 2 status post right wide excision lumpectomy, right axillary sentinel lymph node biopsy  (7/2023) followed by XRT No evidence of disease recurrence on CBE   1. Annual bilateral mammogram and breast ultrasound due 6/2024. 2. Follow up office visit 6/2024 (after imaging) 3. Advised monthly self breast examinations and advised her to contact me if she has any concerns.

## 2024-01-10 NOTE — PHYSICAL EXAM
[Normocephalic] : normocephalic [Atraumatic] : atraumatic [EOMI] : extra ocular movement intact [Sclera nonicteric] : sclera nonicteric [Supple] : supple [No Supraclavicular Adenopathy] : no supraclavicular adenopathy [Examined in the supine and seated position] : examined in the supine and seated position [No dominant masses] : no dominant masses in right breast  [No dominant masses] : no dominant masses left breast [No Nipple Retraction] : no left nipple retraction [No Nipple Discharge] : no left nipple discharge [No Axillary Lymphadenopathy] : no left axillary lymphadenopathy [No Edema] : no edema [No Rashes] : no rashes [No Ulceration] : no ulceration [de-identified] : Well-healed lower outer and axillary incisions. Right lower outer incision with retraction. Diffuse post-XRT skin hyperpigmentation.

## 2024-02-29 ENCOUNTER — APPOINTMENT (OUTPATIENT)
Dept: ENDOCRINOLOGY | Facility: CLINIC | Age: 74
End: 2024-02-29
Payer: MEDICARE

## 2024-02-29 DIAGNOSIS — M81.0 AGE-RELATED OSTEOPOROSIS W/OUT CURRENT PATHOLOGICAL FRACTURE: ICD-10-CM

## 2024-02-29 PROCEDURE — 96372 THER/PROPH/DIAG INJ SC/IM: CPT

## 2024-02-29 RX ORDER — DENOSUMAB 60 MG/ML
60 INJECTION SUBCUTANEOUS
Qty: 1 | Refills: 0 | Status: COMPLETED | OUTPATIENT
Start: 2024-02-29

## 2024-02-29 RX ADMIN — DENOSUMAB 60 MG/ML: 60 INJECTION SUBCUTANEOUS at 00:00

## 2024-03-18 LAB
25(OH)D3 SERPL-MCNC: 45.5 NG/ML
ALBUMIN SERPL ELPH-MCNC: 4.6 G/DL
ALP BLD-CCNC: 99 U/L
ALT SERPL-CCNC: 17 U/L
ANION GAP SERPL CALC-SCNC: 12 MMOL/L
AST SERPL-CCNC: 18 U/L
BILIRUB SERPL-MCNC: 0.6 MG/DL
BUN SERPL-MCNC: 15 MG/DL
CALCIUM SERPL-MCNC: 10.4 MG/DL
CHLORIDE SERPL-SCNC: 100 MMOL/L
CO2 SERPL-SCNC: 27 MMOL/L
COLLAGEN CTX SERPL-MCNC: 365 PG/ML
COLLAGEN NTX UR-SCNC: 580 NMOL BCE
COLLAGEN NTX/CREAT UR-SRTO: 62
CREAT SERPL-MCNC: 0.81 MG/DL
CREAT UR-MCNC: 106.5 MG/DL
EGFR: 77 ML/MIN/1.73M2
GLUCOSE SERPL-MCNC: 95 MG/DL
HCT VFR BLD CALC: 36.5 %
HGB BLD-MCNC: 12.4 G/DL
INTERPRETIVE GUIDE:: NORMAL
MCHC RBC-ENTMCNC: 33.3 PG
MCHC RBC-ENTMCNC: 34 GM/DL
MCV RBC AUTO: 98.1 FL
PLATELET # BLD AUTO: 345 K/UL
POTASSIUM SERPL-SCNC: 4.5 MMOL/L
PROT SERPL-MCNC: 7.6 G/DL
RBC # BLD: 3.72 M/UL
RBC # FLD: 12.9 %
SODIUM SERPL-SCNC: 139 MMOL/L
WBC # FLD AUTO: 8.03 K/UL

## 2024-04-29 NOTE — ASU DISCHARGE PLAN (ADULT/PEDIATRIC) - NURSING INSTRUCTIONS
Last dose of Tylenol given at 3pm.  Next dose to be given at 9pm Skin normal color for race, warm, dry and intact. No evidence of rash.

## 2024-06-18 ENCOUNTER — NON-APPOINTMENT (OUTPATIENT)
Age: 74
End: 2024-06-18

## 2024-06-18 ENCOUNTER — APPOINTMENT (OUTPATIENT)
Dept: INTERNAL MEDICINE | Facility: CLINIC | Age: 74
End: 2024-06-18
Payer: MEDICARE

## 2024-06-18 VITALS
OXYGEN SATURATION: 99 % | RESPIRATION RATE: 14 BRPM | WEIGHT: 135 LBS | HEART RATE: 90 BPM | BODY MASS INDEX: 26.5 KG/M2 | DIASTOLIC BLOOD PRESSURE: 70 MMHG | SYSTOLIC BLOOD PRESSURE: 114 MMHG | HEIGHT: 60 IN

## 2024-06-18 DIAGNOSIS — Z00.00 ENCOUNTER FOR GENERAL ADULT MEDICAL EXAMINATION W/OUT ABNORMAL FINDINGS: ICD-10-CM

## 2024-06-18 DIAGNOSIS — E78.5 HYPERLIPIDEMIA, UNSPECIFIED: ICD-10-CM

## 2024-06-18 PROCEDURE — G0439: CPT

## 2024-06-18 NOTE — HEALTH RISK ASSESSMENT
[Good] : ~his/her~  mood as  good [No falls in past year] : Patient reported no falls in the past year [0] : 2) Feeling down, depressed, or hopeless: Not at all (0) [Patient reported colonoscopy was normal] : Patient reported colonoscopy was normal [With Family] : lives with family [Feels Safe at Home] : Feels safe at home [Fully functional (bathing, dressing, toileting, transferring, walking, feeding)] : Fully functional (bathing, dressing, toileting, transferring, walking, feeding) [Fully functional (using the telephone, shopping, preparing meals, housekeeping, doing laundry, using] : Fully functional and needs no help or supervision to perform IADLs (using the telephone, shopping, preparing meals, housekeeping, doing laundry, using transportation, managing medications and managing finances) [Reports changes in hearing] : Reports no changes in hearing [Reports changes in vision] : Reports no changes in vision [MammogramComments] : next july 1 2024 [ColonoscopyDate] : 2021 [ColonoscopyComments] : repeat 2026

## 2024-06-19 LAB
25(OH)D3 SERPL-MCNC: 49 NG/ML
ALBUMIN SERPL ELPH-MCNC: 4.4 G/DL
ALP BLD-CCNC: 76 U/L
ALT SERPL-CCNC: 12 U/L
ANION GAP SERPL CALC-SCNC: 15 MMOL/L
APPEARANCE: CLEAR
AST SERPL-CCNC: 18 U/L
BACTERIA: NEGATIVE /HPF
BASOPHILS # BLD AUTO: 0.05 K/UL
BASOPHILS NFR BLD AUTO: 0.7 %
BILIRUB SERPL-MCNC: 0.5 MG/DL
BILIRUBIN URINE: NEGATIVE
BLOOD URINE: NEGATIVE
BUN SERPL-MCNC: 18 MG/DL
CALCIUM SERPL-MCNC: 9.8 MG/DL
CAST: 0 /LPF
CHLORIDE SERPL-SCNC: 103 MMOL/L
CHOLEST SERPL-MCNC: 229 MG/DL
CO2 SERPL-SCNC: 22 MMOL/L
COLOR: YELLOW
CREAT SERPL-MCNC: 0.76 MG/DL
EGFR: 83 ML/MIN/1.73M2
EOSINOPHIL # BLD AUTO: 0.23 K/UL
EOSINOPHIL NFR BLD AUTO: 3 %
EPITHELIAL CELLS: 1 /HPF
ESTIMATED AVERAGE GLUCOSE: 105 MG/DL
FOLATE SERPL-MCNC: 19.5 NG/ML
GLUCOSE QUALITATIVE U: NEGATIVE MG/DL
GLUCOSE SERPL-MCNC: 94 MG/DL
HBA1C MFR BLD HPLC: 5.3 %
HCT VFR BLD CALC: 37.9 %
HDLC SERPL-MCNC: 84 MG/DL
HGB BLD-MCNC: 12.1 G/DL
IMM GRANULOCYTES NFR BLD AUTO: 0.5 %
KETONES URINE: NEGATIVE MG/DL
LDLC SERPL CALC-MCNC: 132 MG/DL
LEUKOCYTE ESTERASE URINE: NEGATIVE
LYMPHOCYTES # BLD AUTO: 1.64 K/UL
LYMPHOCYTES NFR BLD AUTO: 21.7 %
MAN DIFF?: NORMAL
MCHC RBC-ENTMCNC: 31.9 GM/DL
MCHC RBC-ENTMCNC: 32 PG
MCV RBC AUTO: 100.3 FL
MICROSCOPIC-UA: NORMAL
MONOCYTES # BLD AUTO: 0.85 K/UL
MONOCYTES NFR BLD AUTO: 11.3 %
NEUTROPHILS # BLD AUTO: 4.74 K/UL
NEUTROPHILS NFR BLD AUTO: 62.8 %
NITRITE URINE: NEGATIVE
NONHDLC SERPL-MCNC: 145 MG/DL
PH URINE: 5.5
PLATELET # BLD AUTO: 328 K/UL
POTASSIUM SERPL-SCNC: 4.9 MMOL/L
PROT SERPL-MCNC: 7.6 G/DL
PROTEIN URINE: NEGATIVE MG/DL
RBC # BLD: 3.78 M/UL
RBC # FLD: 13.2 %
RED BLOOD CELLS URINE: 1 /HPF
SODIUM SERPL-SCNC: 140 MMOL/L
SPECIFIC GRAVITY URINE: 1.02
TRIGL SERPL-MCNC: 72 MG/DL
TSH SERPL-ACNC: 2.63 UIU/ML
UROBILINOGEN URINE: 0.2 MG/DL
VIT B12 SERPL-MCNC: 1017 PG/ML
WBC # FLD AUTO: 7.55 K/UL
WHITE BLOOD CELLS URINE: 1 /HPF

## 2024-07-01 ENCOUNTER — RESULT REVIEW (OUTPATIENT)
Age: 74
End: 2024-07-01

## 2024-07-01 ENCOUNTER — OUTPATIENT (OUTPATIENT)
Dept: OUTPATIENT SERVICES | Facility: HOSPITAL | Age: 74
LOS: 1 days | End: 2024-07-01
Payer: MEDICARE

## 2024-07-01 ENCOUNTER — APPOINTMENT (OUTPATIENT)
Dept: MAMMOGRAPHY | Facility: CLINIC | Age: 74
End: 2024-07-01
Payer: MEDICARE

## 2024-07-01 ENCOUNTER — APPOINTMENT (OUTPATIENT)
Dept: ULTRASOUND IMAGING | Facility: CLINIC | Age: 74
End: 2024-07-01
Payer: MEDICARE

## 2024-07-01 DIAGNOSIS — Z98.891 HISTORY OF UTERINE SCAR FROM PREVIOUS SURGERY: Chronic | ICD-10-CM

## 2024-07-01 DIAGNOSIS — Z90.710 ACQUIRED ABSENCE OF BOTH CERVIX AND UTERUS: Chronic | ICD-10-CM

## 2024-07-01 DIAGNOSIS — Z98.890 OTHER SPECIFIED POSTPROCEDURAL STATES: Chronic | ICD-10-CM

## 2024-07-01 DIAGNOSIS — C50.811 MALIGNANT NEOPLASM OF OVERLAPPING SITES OF RIGHT FEMALE BREAST: ICD-10-CM

## 2024-07-01 PROCEDURE — G0279: CPT

## 2024-07-01 PROCEDURE — 76641 ULTRASOUND BREAST COMPLETE: CPT | Mod: 26,50

## 2024-07-01 PROCEDURE — G0279: CPT | Mod: 26

## 2024-07-01 PROCEDURE — 77066 DX MAMMO INCL CAD BI: CPT | Mod: 26

## 2024-07-01 PROCEDURE — 76641 ULTRASOUND BREAST COMPLETE: CPT

## 2024-07-01 PROCEDURE — 77066 DX MAMMO INCL CAD BI: CPT

## 2024-07-05 ENCOUNTER — OUTPATIENT (OUTPATIENT)
Dept: OUTPATIENT SERVICES | Facility: HOSPITAL | Age: 74
LOS: 1 days | Discharge: HOME | End: 2024-07-05

## 2024-07-05 DIAGNOSIS — C50.919 MALIGNANT NEOPLASM OF UNSPECIFIED SITE OF UNSPECIFIED FEMALE BREAST: ICD-10-CM

## 2024-07-05 DIAGNOSIS — Z98.890 OTHER SPECIFIED POSTPROCEDURAL STATES: Chronic | ICD-10-CM

## 2024-07-05 DIAGNOSIS — Z98.891 HISTORY OF UTERINE SCAR FROM PREVIOUS SURGERY: Chronic | ICD-10-CM

## 2024-07-05 DIAGNOSIS — Z90.710 ACQUIRED ABSENCE OF BOTH CERVIX AND UTERUS: Chronic | ICD-10-CM

## 2024-07-09 ENCOUNTER — APPOINTMENT (OUTPATIENT)
Dept: HEMATOLOGY ONCOLOGY | Facility: CLINIC | Age: 74
End: 2024-07-09
Payer: MEDICARE

## 2024-07-09 VITALS
TEMPERATURE: 98 F | WEIGHT: 134.48 LBS | SYSTOLIC BLOOD PRESSURE: 118 MMHG | DIASTOLIC BLOOD PRESSURE: 76 MMHG | RESPIRATION RATE: 14 BRPM | HEART RATE: 86 BPM | OXYGEN SATURATION: 97 % | BODY MASS INDEX: 26.26 KG/M2

## 2024-07-09 DIAGNOSIS — Z17.0 MALIGNANT NEOPLASM OF OVERLAPPING SITES OF RIGHT FEMALE BREAST: ICD-10-CM

## 2024-07-09 DIAGNOSIS — C50.811 MALIGNANT NEOPLASM OF OVERLAPPING SITES OF RIGHT FEMALE BREAST: ICD-10-CM

## 2024-07-09 DIAGNOSIS — M81.0 AGE-RELATED OSTEOPOROSIS W/OUT CURRENT PATHOLOGICAL FRACTURE: ICD-10-CM

## 2024-07-09 PROCEDURE — 99214 OFFICE O/P EST MOD 30 MIN: CPT

## 2024-07-09 PROCEDURE — G2211 COMPLEX E/M VISIT ADD ON: CPT

## 2024-07-18 NOTE — CONSULT LETTER
[Dear  ___] : Dear  [unfilled], [Courtesy Letter:] : I had the pleasure of seeing your patient, [unfilled], in my office today. [Please see my note below.] : Please see my note below. [Sincerely,] : Sincerely, [FreeTextEntry3] : Sharda Deleon, RPA-C Breast Surgery [address] [place] [address], [place] [address] (Phone) [phone] (Fax) [phone]  [DrTory  ___] : Dr. CARLISLE [DrTory ___] : Dr. CARLISLE

## 2024-07-18 NOTE — PHYSICAL EXAM
[Normocephalic] : normocephalic [Atraumatic] : atraumatic [EOMI] : extra ocular movement intact [Sclera nonicteric] : sclera nonicteric [Supple] : supple [No Supraclavicular Adenopathy] : no supraclavicular adenopathy [Examined in the supine and seated position] : examined in the supine and seated position [No dominant masses] : no dominant masses in right breast  [No dominant masses] : no dominant masses left breast [No Nipple Retraction] : no left nipple retraction [No Nipple Discharge] : no left nipple discharge [No Axillary Lymphadenopathy] : no left axillary lymphadenopathy [No Edema] : no edema [No Rashes] : no rashes [No Ulceration] : no ulceration [de-identified] : Well-healed lower outer and axillary incisions. Right lower outer incision with retraction. Diffuse post-XRT skin hyperpigmentation.

## 2024-07-18 NOTE — ASSESSMENT
[FreeTextEntry1] : Right invasive breast cancers x 2 status post right wide excision lumpectomy, right axillary sentinel lymph node biopsy  (7/2023) followed by XRT No evidence of disease recurrence on CBE   1. Annual bilateral mammogram and breast ultrasound due 7/2025. 2. Follow up office visit 7/2025. 3. Advised monthly self breast examinations and advised her to contact me if she has any concerns.

## 2024-07-18 NOTE — HISTORY OF PRESENT ILLNESS
[FreeTextEntry1] : Patient is a 74 year old female here today for a post op visit. She has no family history of breast or ovarian cancer. She is relatively healthy. 6/1/2023 Bilateral mammogram: Tyrer-Cuzick Lifetime Risk: 3.5% No suspicious mass, suspicious microcalcifications, or other sign of malignancy is identified. Bilateral breast ultrasound: At right 9:00, 9 cm from the nipple there is a 0.2 x 0.4 x 0.4 cm ill-defined hypoechoic area, indeterminate. Recommend further evaluation with right targeted ultrasound.  At right 9:00, 8 cm from the nipple there is a stable 0.9 x 0.5 x 0.3 cm lobulated hypoechoic mass, stable since at least 11/4/2020. At left 3:00, 12 cm from the nipple there is a 0.8 x 0.4 x 0.6 cm intramammary lymph node. BI-RADS 0.  6/9/2023 Right breast ultrasound:  In the 9:00 location 9 cm from the nipple again visualized is an ill-defined hypoechoic nonmass region measuring 0.5 x 0.7 x 0.6 cm. No right axillary lymphadenopathy is appreciated. Recommend US guided core biopsy. BI-RADS 4B.  6/15/2023 Right 9:00 (9cmFN) US guided core biopsy: A twirl shaped marking clip was placed.  Pathology revealed invasive lobular carcinoma, invasive tumor measures at least 6 mm, microcalcifications not detected, lymphovascular permeation by tumor not seen, ID papilloma, 2mm. ER+ 90%/SD+ 70%/Her2- (1+ membrane staining).  Appropriate surgical and oncologic management is recommended. Breast MRI is also recommended prior to definitive surgical management. These results are concordant.  6/27/2023 bilateral breast MRI: Recently diagnosed right breast malignancy associated with the mass and non-mass enhancement measuring up to 1.6 cm.  There is a 1.7 cm linear non-mass enhancement 2.9 cm superior to the index malignancy of the right breast for which a right MRI guided biopsy advised.  No suspicious enhancement left breast.  Nonspecific 8 mm enhancing dermal lesion within the upper outer left breast.  Correlation with clinical exam is recommended.  BI-RADS 4A. 6/30/2023 Right MRI guided core biopsy: A buckle shaped marking clip was placed. Pathology revealed: Invasive mammary carcinoma. Tera score 5/9 (2 + 2 + 1) in this limited material. Invasive tumor measures at least 4 mm. Ductal carcinoma in situ is not identified. Microcalcifications are absent. Lymphovascular permeation by tumor is not seen. ER+90%/SD+70%/HER 2-. These results are concordant. Surgical oncologic management recommended.  7/13/2023 right upper outer SIMEON  localization x2 wide excision lumpectomy, right axillary deep sentinel lymph node biopsy. Pathology revealed right axillary sentinel lymph nodes x 2 benign.   Right 9:00 core biopsy scar negative.   Right upper outer lumpectomy:  Tumor 1 revealed infiltrating ductal carcinoma, grade 1, 1.7 cm.   Tumor 2 revealed infiltrating lobular carcinoma, grade 2, 1.6 cm size.  Negative margins.   Fibroadenoma with lobular carcinoma in situ.  2 intramammary lymph nodes negative.   Right 11:00 core biopsy scar mid 7/1/2024 Bilateral mammogram: Dense breasts. Benign new post lumpectomy changes are seen in the right breast. Benign coarse calcifications retroareolar left breast. Bilateral ultrasound: Right breast scar with associated benign seroma seen in the right 9:00 location measuring 1.5 cm. Left 3:00 location 12 cm from the nipple a benign stable 6 mm intramammary lymph node is seen. BI-RADS 2. Oncotype DX Recurrence Score: 22 Oncotype DX Recurrence Score: 3 Rad ONC: Dr. Louise, completed XRT 9/22/2023. Saw her for follow up 10/23/2023 Med ONC: Dr. Medrano, she started Anastrozole 11/18/2023 and saw her for follow up yesterday. She denies any current breast concerns

## 2024-07-25 ENCOUNTER — NON-APPOINTMENT (OUTPATIENT)
Age: 74
End: 2024-07-25

## 2024-07-26 ENCOUNTER — APPOINTMENT (OUTPATIENT)
Dept: PLASTIC SURGERY | Facility: CLINIC | Age: 74
End: 2024-07-26
Payer: MEDICARE

## 2024-07-26 VITALS
WEIGHT: 132 LBS | DIASTOLIC BLOOD PRESSURE: 71 MMHG | OXYGEN SATURATION: 98 % | BODY MASS INDEX: 25.91 KG/M2 | HEIGHT: 60 IN | HEART RATE: 88 BPM | SYSTOLIC BLOOD PRESSURE: 143 MMHG | TEMPERATURE: 98.2 F

## 2024-07-26 DIAGNOSIS — Z17.0 MALIGNANT NEOPLASM OF OVERLAPPING SITES OF RIGHT FEMALE BREAST: ICD-10-CM

## 2024-07-26 DIAGNOSIS — C50.811 MALIGNANT NEOPLASM OF OVERLAPPING SITES OF RIGHT FEMALE BREAST: ICD-10-CM

## 2024-07-26 PROCEDURE — 99213 OFFICE O/P EST LOW 20 MIN: CPT

## 2024-07-26 NOTE — CONSULT LETTER
[FreeTextEntry3] : Sharda Deleon, RPA-C Breast Surgery [address] [place] [address], [place] [address] (Phone) [phone] (Fax) [phone]

## 2024-07-26 NOTE — HISTORY OF PRESENT ILLNESS
[FreeTextEntry1] : Patient is a 74 year old female here today for a follow up office visit. She has no family history of breast or ovarian cancer. She is relatively healthy. 6/1/2023 Bilateral mammogram: Tyrer-Cuzick Lifetime Risk: 3.5% No suspicious mass, suspicious microcalcifications, or other sign of malignancy is identified. Bilateral breast ultrasound: At right 9:00, 9 cm from the nipple there is a 0.2 x 0.4 x 0.4 cm ill-defined hypoechoic area, indeterminate. Recommend further evaluation with right targeted ultrasound.  At right 9:00, 8 cm from the nipple there is a stable 0.9 x 0.5 x 0.3 cm lobulated hypoechoic mass, stable since at least 11/4/2020. At left 3:00, 12 cm from the nipple there is a 0.8 x 0.4 x 0.6 cm intramammary lymph node. BI-RADS 0.  6/9/2023 Right breast ultrasound:  In the 9:00 location 9 cm from the nipple again visualized is an ill-defined hypoechoic nonmass region measuring 0.5 x 0.7 x 0.6 cm. No right axillary lymphadenopathy is appreciated. Recommend US guided core biopsy. BI-RADS 4B.  6/15/2023 Right 9:00 (9cmFN) US guided core biopsy: A twirl shaped marking clip was placed.  Pathology revealed invasive lobular carcinoma, invasive tumor measures at least 6 mm, microcalcifications not detected, lymphovascular permeation by tumor not seen, ID papilloma, 2mm. ER+ 90%/IL+ 70%/Her2- (1+ membrane staining).  Appropriate surgical and oncologic management is recommended. Breast MRI is also recommended prior to definitive surgical management. These results are concordant.  6/27/2023 bilateral breast MRI: Recently diagnosed right breast malignancy associated with the mass and non-mass enhancement measuring up to 1.6 cm.  There is a 1.7 cm linear non-mass enhancement 2.9 cm superior to the index malignancy of the right breast for which a right MRI guided biopsy advised.  No suspicious enhancement left breast.  Nonspecific 8 mm enhancing dermal lesion within the upper outer left breast.  Correlation with clinical exam is recommended.  BI-RADS 4A. 6/30/2023 Right MRI guided core biopsy: A buckle shaped marking clip was placed. Pathology revealed: Invasive mammary carcinoma. [place] score 5/9 (2 + 2 + 1) in this limited material. Invasive tumor measures at least 4 mm. Ductal carcinoma in situ is not identified. Microcalcifications are absent. Lymphovascular permeation by tumor is not seen. ER+90%/IL+70%/HER 2-. These results are concordant. Surgical oncologic management recommended.  7/13/2023 right upper outer SIMEON  localization x2 wide excision lumpectomy, right axillary deep sentinel lymph node biopsy. Pathology revealed right axillary sentinel lymph nodes x 2 benign.   Right 9:00 core biopsy scar negative.   Right upper outer lumpectomy:  Tumor 1 revealed infiltrating ductal carcinoma, grade 1, 1.7 cm.   Tumor 2 revealed infiltrating lobular carcinoma, grade 2, 1.6 cm size.  Negative margins.   Fibroadenoma with lobular carcinoma in situ.  2 intramammary lymph nodes negative.   Right 11:00 core biopsy scar mid 7/1/2024 Bilateral mammogram: Dense breasts. Benign new post lumpectomy changes are seen in the right breast. Benign coarse calcifications retroareolar left breast. Bilateral ultrasound: Right breast scar with associated benign seroma seen in the right 9:00 location measuring 1.5 cm. Left 3:00 location 12 cm from the nipple a benign stable 6 mm intramammary lymph node is seen. BI-RADS 2. Oncotype DX Recurrence Score: 22 Oncotype DX Recurrence Score: 3 Rad ONC: Dr. Louise, completed XRT 9/22/2023. Saw her for follow up 10/23/2023 Med ONC: Dr. Medrano, she started Anastrozole 11/18/2023 and saw her for follow up 7/9/24.  She will see her again 1/2025. She denies any current breast concerns

## 2024-07-26 NOTE — PHYSICAL EXAM
[Asymmetrical] : asymmetrical [de-identified] : Bilateral ptosis.  Her left breast is larger than her right. [de-identified] : Well-healed lower outer and axillary incisions. Right lower outer incision with retraction. Diffuse post-XRT skin hyperpigmentation.

## 2024-07-26 NOTE — HISTORY OF PRESENT ILLNESS
[FreeTextEntry1] : Patient is a 74 year old female here today for a follow up office visit. She has no family history of breast or ovarian cancer. She is relatively healthy. 6/1/2023 Bilateral mammogram: Tyrer-Cuzick Lifetime Risk: 3.5% No suspicious mass, suspicious microcalcifications, or other sign of malignancy is identified. Bilateral breast ultrasound: At right 9:00, 9 cm from the nipple there is a 0.2 x 0.4 x 0.4 cm ill-defined hypoechoic area, indeterminate. Recommend further evaluation with right targeted ultrasound.  At right 9:00, 8 cm from the nipple there is a stable 0.9 x 0.5 x 0.3 cm lobulated hypoechoic mass, stable since at least 11/4/2020. At left 3:00, 12 cm from the nipple there is a 0.8 x 0.4 x 0.6 cm intramammary lymph node. BI-RADS 0.  6/9/2023 Right breast ultrasound:  In the 9:00 location 9 cm from the nipple again visualized is an ill-defined hypoechoic nonmass region measuring 0.5 x 0.7 x 0.6 cm. No right axillary lymphadenopathy is appreciated. Recommend US guided core biopsy. BI-RADS 4B.  6/15/2023 Right 9:00 (9cmFN) US guided core biopsy: A twirl shaped marking clip was placed.  Pathology revealed invasive lobular carcinoma, invasive tumor measures at least 6 mm, microcalcifications not detected, lymphovascular permeation by tumor not seen, ID papilloma, 2mm. ER+ 90%/DC+ 70%/Her2- (1+ membrane staining).  Appropriate surgical and oncologic management is recommended. Breast MRI is also recommended prior to definitive surgical management. These results are concordant.  6/27/2023 bilateral breast MRI: Recently diagnosed right breast malignancy associated with the mass and non-mass enhancement measuring up to 1.6 cm.  There is a 1.7 cm linear non-mass enhancement 2.9 cm superior to the index malignancy of the right breast for which a right MRI guided biopsy advised.  No suspicious enhancement left breast.  Nonspecific 8 mm enhancing dermal lesion within the upper outer left breast.  Correlation with clinical exam is recommended.  BI-RADS 4A. 6/30/2023 Right MRI guided core biopsy: A buckle shaped marking clip was placed. Pathology revealed: Invasive mammary carcinoma. Ellerslie score 5/9 (2 + 2 + 1) in this limited material. Invasive tumor measures at least 4 mm. Ductal carcinoma in situ is not identified. Microcalcifications are absent. Lymphovascular permeation by tumor is not seen. ER+90%/DC+70%/HER 2-. These results are concordant. Surgical oncologic management recommended.  7/13/2023 right upper outer SIMEON  localization x2 wide excision lumpectomy, right axillary deep sentinel lymph node biopsy. Pathology revealed right axillary sentinel lymph nodes x 2 benign.   Right 9:00 core biopsy scar negative.   Right upper outer lumpectomy:  Tumor 1 revealed infiltrating ductal carcinoma, grade 1, 1.7 cm.   Tumor 2 revealed infiltrating lobular carcinoma, grade 2, 1.6 cm size.  Negative margins.   Fibroadenoma with lobular carcinoma in situ.  2 intramammary lymph nodes negative.   Right 11:00 core biopsy scar mid 7/1/2024 Bilateral mammogram: Dense breasts. Benign new post lumpectomy changes are seen in the right breast. Benign coarse calcifications retroareolar left breast. Bilateral ultrasound: Right breast scar with associated benign seroma seen in the right 9:00 location measuring 1.5 cm. Left 3:00 location 12 cm from the nipple a benign stable 6 mm intramammary lymph node is seen. BI-RADS 2. Oncotype DX Recurrence Score: 22 Oncotype DX Recurrence Score: 3 Rad ONC: Dr. Louise, completed XRT 9/22/2023. Saw her for follow up 10/23/2023 Med ONC: Dr. Medrano, she started Anastrozole 11/18/2023 and saw her for follow up 7/9/24.  She will see her again 1/2025. She denies any current breast concerns

## 2024-07-26 NOTE — CONSULT LETTER
[FreeTextEntry3] : Sharda Deleon, RPA-C Breast Surgery [address] [place] [address], [place] [address] (Phone) [phone] (Fax) [phone]

## 2024-07-26 NOTE — ASSESSMENT
[FreeTextEntry1] : Right invasive breast cancers x 2 status post right wide excision lumpectomy, right axillary sentinel lymph node biopsy  (7/2023) followed by XRT No evidence of disease recurrence on clinical breast exam  1. Annual bilateral mammogram and breast ultrasound due 7/2025. 2. Follow up office visit 1/2025. 3. Advised monthly self breast examinations and advised her to contact me if she has any concerns. 4.  She has breast asymmetry.  I provided her the names of plastic surgeons, Dr. Harvey Santana and Dr. Lauren Shikowitz-Behr to consider consultation to address the asymmetry.

## 2024-07-26 NOTE — PHYSICAL EXAM
[Asymmetrical] : asymmetrical [de-identified] : Bilateral ptosis.  Her left breast is larger than her right. [de-identified] : Well-healed lower outer and axillary incisions. Right lower outer incision with retraction. Diffuse post-XRT skin hyperpigmentation.

## 2024-08-09 ENCOUNTER — APPOINTMENT (OUTPATIENT)
Dept: PLASTIC SURGERY | Facility: CLINIC | Age: 74
End: 2024-08-09

## 2024-08-09 PROCEDURE — 99214 OFFICE O/P EST MOD 30 MIN: CPT

## 2024-08-09 PROCEDURE — 99204 OFFICE O/P NEW MOD 45 MIN: CPT

## 2024-08-12 PROBLEM — N64.89 BREAST ASYMMETRY: Status: ACTIVE | Noted: 2024-08-12

## 2024-08-15 PROBLEM — C50.919 MALIGNANT NEOPLASM OF BREAST: Status: ACTIVE | Noted: 2023-06-22

## 2024-08-15 PROBLEM — Z51.0 ENCOUNTER FOR RADIOTHERAPY: Status: ACTIVE | Noted: 2023-09-01

## 2024-08-15 NOTE — PHYSICAL EXAM
[NI] : Normal [de-identified] : NAD, AxOx3  [de-identified] : nonlabored breathing [de-identified] : normal HR [de-identified] : RIGHT: SN-N 24.5, N-IMF 7.5, BW 12 LEFT: SN-N 27, N-IMF 7.5, BW 14 Left breast grade 3 ptosis  Right breast with tethered scar lateral aspect with associated hollowing No ptosis to the right breast  Minimal RT changes right breast  [de-identified] : no edema  [de-identified] : as above [de-identified] : grossly intact  [de-identified] : normal affect

## 2024-08-15 NOTE — HISTORY OF PRESENT ILLNESS
[FreeTextEntry1] : MAEVE ELIZONDO is a 74-year-old female presenting to the office today with a history of right breast cancer. She is s/p lumpectomy and radiation. Completed in 10/2023. Patient now complaining of breast asymmetry. She presents today to discuss surgical options for improved breast symmetry.  PMHx- denied    PSHx- hysterectomy,  x2, lumpectomy    Currently taking - Anastrozole    Allergies- NKDA   Never smoker

## 2024-08-15 NOTE — PHYSICAL EXAM
[NI] : Normal [de-identified] : NAD, AxOx3  [de-identified] : nonlabored breathing [de-identified] : normal HR [de-identified] : RIGHT: SN-N 24.5, N-IMF 7.5, BW 12 LEFT: SN-N 27, N-IMF 7.5, BW 14 Left breast grade 3 ptosis  Right breast with tethered scar lateral aspect with associated hollowing No ptosis to the right breast  Minimal RT changes right breast  [de-identified] : no edema  [de-identified] : as above [de-identified] : grossly intact  [de-identified] : normal affect

## 2024-08-15 NOTE — END OF VISIT
[FreeTextEntry3] : All medical record entries made by the Scribe were at my, Dr. Lauren Shikowitz-Behr, MD, direction and personally dictated by me on 08/09/2024. I have reviewed the chart and agree that the record accurately reflects my personal performance of the history, physical exam, assessment and plan. I have also personally directed, reviewed, and agreed with the chart. [Time Spent: ___ minutes] : I have spent [unfilled] minutes of time on the encounter.

## 2024-08-15 NOTE — ADDENDUM
[FreeTextEntry1] :  I, Mike Mcginnis, documented this note as a scribe on behalf of Dr. Lauren Shikowitz-Behr, MD on 08/09/2024.

## 2024-08-29 ENCOUNTER — APPOINTMENT (OUTPATIENT)
Dept: ENDOCRINOLOGY | Facility: CLINIC | Age: 74
End: 2024-08-29
Payer: MEDICARE

## 2024-08-29 DIAGNOSIS — M81.0 AGE-RELATED OSTEOPOROSIS W/OUT CURRENT PATHOLOGICAL FRACTURE: ICD-10-CM

## 2024-08-29 LAB
25(OH)D3 SERPL-MCNC: 39.2 NG/ML
ALBUMIN SERPL ELPH-MCNC: 4.4 G/DL
ALP BLD-CCNC: 84 U/L
ALT SERPL-CCNC: 13 U/L
ANION GAP SERPL CALC-SCNC: 11 MMOL/L
AST SERPL-CCNC: 18 U/L
BILIRUB SERPL-MCNC: 0.2 MG/DL
BUN SERPL-MCNC: 12 MG/DL
CALCIUM SERPL-MCNC: 9.7 MG/DL
CHLORIDE SERPL-SCNC: 107 MMOL/L
CO2 SERPL-SCNC: 25 MMOL/L
CREAT SERPL-MCNC: 0.79 MG/DL
EGFR: 79 ML/MIN/1.73M2
GLUCOSE SERPL-MCNC: 89 MG/DL
POTASSIUM SERPL-SCNC: 4.6 MMOL/L
PROT SERPL-MCNC: 7.3 G/DL
SODIUM SERPL-SCNC: 144 MMOL/L

## 2024-08-29 PROCEDURE — 96372 THER/PROPH/DIAG INJ SC/IM: CPT

## 2024-08-29 RX ADMIN — DENOSUMAB 60 MG/ML: 60 INJECTION SUBCUTANEOUS at 00:00

## 2024-08-30 RX ORDER — DENOSUMAB 60 MG/ML
60 INJECTION SUBCUTANEOUS
Qty: 1 | Refills: 0 | Status: COMPLETED | OUTPATIENT
Start: 2024-08-29

## 2024-09-03 LAB
COLLAGEN NTX UR-SCNC: 361 NMOL BCE
COLLAGEN NTX/CREAT UR-SRTO: 43
CREAT UR-MCNC: 93.9 MG/DL
INTERPRETIVE GUIDE:: NORMAL

## 2024-09-05 LAB — COLLAGEN CTX SERPL-MCNC: 255 PG/ML

## 2024-11-11 ENCOUNTER — APPOINTMENT (OUTPATIENT)
Dept: ENDOCRINOLOGY | Facility: CLINIC | Age: 74
End: 2024-11-11
Payer: MEDICARE

## 2024-11-11 VITALS
HEART RATE: 98 BPM | WEIGHT: 130 LBS | HEIGHT: 60 IN | SYSTOLIC BLOOD PRESSURE: 145 MMHG | OXYGEN SATURATION: 97 % | BODY MASS INDEX: 25.52 KG/M2 | DIASTOLIC BLOOD PRESSURE: 78 MMHG

## 2024-11-11 DIAGNOSIS — M81.0 AGE-RELATED OSTEOPOROSIS W/OUT CURRENT PATHOLOGICAL FRACTURE: ICD-10-CM

## 2024-11-11 PROCEDURE — 99214 OFFICE O/P EST MOD 30 MIN: CPT

## 2024-11-13 ENCOUNTER — APPOINTMENT (OUTPATIENT)
Dept: RADIOLOGY | Facility: CLINIC | Age: 74
End: 2024-11-13
Payer: MEDICARE

## 2024-11-13 ENCOUNTER — OUTPATIENT (OUTPATIENT)
Dept: OUTPATIENT SERVICES | Facility: HOSPITAL | Age: 74
LOS: 1 days | End: 2024-11-13
Payer: MEDICARE

## 2024-11-13 DIAGNOSIS — Z98.891 HISTORY OF UTERINE SCAR FROM PREVIOUS SURGERY: Chronic | ICD-10-CM

## 2024-11-13 DIAGNOSIS — Z98.890 OTHER SPECIFIED POSTPROCEDURAL STATES: Chronic | ICD-10-CM

## 2024-11-13 DIAGNOSIS — M81.0 AGE-RELATED OSTEOPOROSIS WITHOUT CURRENT PATHOLOGICAL FRACTURE: ICD-10-CM

## 2024-11-13 DIAGNOSIS — Z90.710 ACQUIRED ABSENCE OF BOTH CERVIX AND UTERUS: Chronic | ICD-10-CM

## 2024-11-13 PROCEDURE — 77080 DXA BONE DENSITY AXIAL: CPT | Mod: 26

## 2024-11-13 PROCEDURE — 77080 DXA BONE DENSITY AXIAL: CPT

## 2025-01-06 ENCOUNTER — OUTPATIENT (OUTPATIENT)
Dept: OUTPATIENT SERVICES | Facility: HOSPITAL | Age: 75
LOS: 1 days | Discharge: HOME | End: 2025-01-06

## 2025-01-06 DIAGNOSIS — Z98.890 OTHER SPECIFIED POSTPROCEDURAL STATES: Chronic | ICD-10-CM

## 2025-01-06 DIAGNOSIS — Z90.710 ACQUIRED ABSENCE OF BOTH CERVIX AND UTERUS: Chronic | ICD-10-CM

## 2025-01-06 DIAGNOSIS — Z98.891 HISTORY OF UTERINE SCAR FROM PREVIOUS SURGERY: Chronic | ICD-10-CM

## 2025-01-07 ENCOUNTER — NON-APPOINTMENT (OUTPATIENT)
Age: 75
End: 2025-01-07

## 2025-01-07 ENCOUNTER — APPOINTMENT (OUTPATIENT)
Dept: HEMATOLOGY ONCOLOGY | Facility: CLINIC | Age: 75
End: 2025-01-07
Payer: MEDICARE

## 2025-01-07 VITALS
TEMPERATURE: 98.5 F | OXYGEN SATURATION: 97 % | WEIGHT: 134.48 LBS | HEIGHT: 58.66 IN | RESPIRATION RATE: 13 BRPM | SYSTOLIC BLOOD PRESSURE: 133 MMHG | DIASTOLIC BLOOD PRESSURE: 81 MMHG | HEART RATE: 87 BPM | BODY MASS INDEX: 27.48 KG/M2

## 2025-01-07 DIAGNOSIS — Z17.0 MALIGNANT NEOPLASM OF OVERLAPPING SITES OF RIGHT FEMALE BREAST: ICD-10-CM

## 2025-01-07 DIAGNOSIS — T45.1X5A PAIN IN UNSPECIFIED JOINT: ICD-10-CM

## 2025-01-07 DIAGNOSIS — M25.50 PAIN IN UNSPECIFIED JOINT: ICD-10-CM

## 2025-01-07 DIAGNOSIS — C50.811 MALIGNANT NEOPLASM OF OVERLAPPING SITES OF RIGHT FEMALE BREAST: ICD-10-CM

## 2025-01-07 DIAGNOSIS — M81.0 AGE-RELATED OSTEOPOROSIS W/OUT CURRENT PATHOLOGICAL FRACTURE: ICD-10-CM

## 2025-01-07 PROCEDURE — G2211 COMPLEX E/M VISIT ADD ON: CPT

## 2025-01-07 PROCEDURE — 99214 OFFICE O/P EST MOD 30 MIN: CPT

## 2025-01-07 RX ORDER — LETROZOLE TABLETS 2.5 MG/1
2.5 TABLET, FILM COATED ORAL DAILY
Qty: 90 | Refills: 1 | Status: ACTIVE | COMMUNITY
Start: 2025-01-07 | End: 1900-01-01

## 2025-01-08 ENCOUNTER — APPOINTMENT (OUTPATIENT)
Dept: PLASTIC SURGERY | Facility: CLINIC | Age: 75
End: 2025-01-08
Payer: MEDICARE

## 2025-01-08 ENCOUNTER — NON-APPOINTMENT (OUTPATIENT)
Age: 75
End: 2025-01-08

## 2025-01-13 ENCOUNTER — APPOINTMENT (OUTPATIENT)
Dept: PLASTIC SURGERY | Facility: CLINIC | Age: 75
End: 2025-01-13
Payer: MEDICARE

## 2025-01-13 VITALS
SYSTOLIC BLOOD PRESSURE: 144 MMHG | TEMPERATURE: 97.3 F | BODY MASS INDEX: 27.17 KG/M2 | HEIGHT: 58.66 IN | HEART RATE: 87 BPM | OXYGEN SATURATION: 98 % | DIASTOLIC BLOOD PRESSURE: 78 MMHG | WEIGHT: 133 LBS

## 2025-01-13 DIAGNOSIS — C50.919 MALIGNANT NEOPLASM OF UNSPECIFIED SITE OF UNSPECIFIED FEMALE BREAST: ICD-10-CM

## 2025-01-13 PROCEDURE — 99213 OFFICE O/P EST LOW 20 MIN: CPT

## 2025-03-03 NOTE — ASU PATIENT PROFILE, ADULT - URINARY CATHETER
no

## 2025-03-04 ENCOUNTER — APPOINTMENT (OUTPATIENT)
Dept: ENDOCRINOLOGY | Facility: CLINIC | Age: 75
End: 2025-03-04
Payer: MEDICARE

## 2025-03-04 DIAGNOSIS — M81.0 AGE-RELATED OSTEOPOROSIS W/OUT CURRENT PATHOLOGICAL FRACTURE: ICD-10-CM

## 2025-03-04 PROCEDURE — 96372 THER/PROPH/DIAG INJ SC/IM: CPT

## 2025-03-04 RX ORDER — DENOSUMAB 60 MG/ML
60 INJECTION SUBCUTANEOUS
Qty: 1 | Refills: 0 | Status: COMPLETED | OUTPATIENT
Start: 2025-03-04

## 2025-03-04 RX ADMIN — DENOSUMAB 60 MG/ML: 60 INJECTION SUBCUTANEOUS at 00:00

## 2025-03-12 LAB
25(OH)D3 SERPL-MCNC: 62.4 NG/ML
ALBUMIN SERPL ELPH-MCNC: 4.5 G/DL
ALP BLD-CCNC: 93 U/L
ALT SERPL-CCNC: 25 U/L
ANION GAP SERPL CALC-SCNC: 13 MMOL/L
AST SERPL-CCNC: 28 U/L
BILIRUB SERPL-MCNC: 0.5 MG/DL
BUN SERPL-MCNC: 15 MG/DL
CALCIUM SERPL-MCNC: 10.6 MG/DL
CHLORIDE SERPL-SCNC: 103 MMOL/L
CO2 SERPL-SCNC: 26 MMOL/L
CREAT SERPL-MCNC: 0.74 MG/DL
EGFRCR SERPLBLD CKD-EPI 2021: 85 ML/MIN/1.73M2
GLUCOSE SERPL-MCNC: 101 MG/DL
POTASSIUM SERPL-SCNC: 4.8 MMOL/L
PROT SERPL-MCNC: 7.5 G/DL
SODIUM SERPL-SCNC: 142 MMOL/L

## 2025-04-07 ENCOUNTER — RX RENEWAL (OUTPATIENT)
Age: 75
End: 2025-04-07

## 2025-07-07 ENCOUNTER — RESULT REVIEW (OUTPATIENT)
Age: 75
End: 2025-07-07

## 2025-07-07 ENCOUNTER — OUTPATIENT (OUTPATIENT)
Dept: OUTPATIENT SERVICES | Facility: HOSPITAL | Age: 75
LOS: 1 days | End: 2025-07-07
Payer: MEDICARE

## 2025-07-07 ENCOUNTER — APPOINTMENT (OUTPATIENT)
Dept: ULTRASOUND IMAGING | Facility: CLINIC | Age: 75
End: 2025-07-07
Payer: MEDICARE

## 2025-07-07 ENCOUNTER — APPOINTMENT (OUTPATIENT)
Dept: MAMMOGRAPHY | Facility: CLINIC | Age: 75
End: 2025-07-07
Payer: MEDICARE

## 2025-07-07 DIAGNOSIS — Z98.891 HISTORY OF UTERINE SCAR FROM PREVIOUS SURGERY: Chronic | ICD-10-CM

## 2025-07-07 DIAGNOSIS — Z00.8 ENCOUNTER FOR OTHER GENERAL EXAMINATION: ICD-10-CM

## 2025-07-07 DIAGNOSIS — R92.30 DENSE BREASTS, UNSPECIFIED: ICD-10-CM

## 2025-07-07 DIAGNOSIS — C50.811 MALIGNANT NEOPLASM OF OVERLAPPING SITES OF RIGHT FEMALE BREAST: ICD-10-CM

## 2025-07-07 DIAGNOSIS — Z98.890 OTHER SPECIFIED POSTPROCEDURAL STATES: Chronic | ICD-10-CM

## 2025-07-07 DIAGNOSIS — Z90.710 ACQUIRED ABSENCE OF BOTH CERVIX AND UTERUS: Chronic | ICD-10-CM

## 2025-07-07 PROCEDURE — 77066 DX MAMMO INCL CAD BI: CPT

## 2025-07-07 PROCEDURE — G0279: CPT | Mod: 26

## 2025-07-07 PROCEDURE — 76641 ULTRASOUND BREAST COMPLETE: CPT | Mod: 26,50

## 2025-07-07 PROCEDURE — G0279: CPT

## 2025-07-07 PROCEDURE — 77066 DX MAMMO INCL CAD BI: CPT | Mod: 26

## 2025-07-07 PROCEDURE — 76641 ULTRASOUND BREAST COMPLETE: CPT

## 2025-07-16 ENCOUNTER — NON-APPOINTMENT (OUTPATIENT)
Age: 75
End: 2025-07-16

## 2025-07-16 ENCOUNTER — OUTPATIENT (OUTPATIENT)
Dept: OUTPATIENT SERVICES | Facility: HOSPITAL | Age: 75
LOS: 1 days | Discharge: HOME | End: 2025-07-16

## 2025-07-16 DIAGNOSIS — Z98.890 OTHER SPECIFIED POSTPROCEDURAL STATES: Chronic | ICD-10-CM

## 2025-07-16 DIAGNOSIS — Z90.710 ACQUIRED ABSENCE OF BOTH CERVIX AND UTERUS: Chronic | ICD-10-CM

## 2025-07-16 DIAGNOSIS — Z98.891 HISTORY OF UTERINE SCAR FROM PREVIOUS SURGERY: Chronic | ICD-10-CM

## 2025-07-16 DIAGNOSIS — C50.919 MALIGNANT NEOPLASM OF UNSPECIFIED SITE OF UNSPECIFIED FEMALE BREAST: ICD-10-CM

## 2025-07-17 ENCOUNTER — APPOINTMENT (OUTPATIENT)
Dept: HEMATOLOGY ONCOLOGY | Facility: CLINIC | Age: 75
End: 2025-07-17
Payer: MEDICARE

## 2025-07-17 VITALS
TEMPERATURE: 97 F | WEIGHT: 137.99 LBS | HEART RATE: 85 BPM | OXYGEN SATURATION: 98 % | DIASTOLIC BLOOD PRESSURE: 68 MMHG | BODY MASS INDEX: 28.2 KG/M2 | SYSTOLIC BLOOD PRESSURE: 132 MMHG | RESPIRATION RATE: 16 BRPM

## 2025-07-17 PROBLEM — Z85.3 HISTORY OF MALIGNANT NEOPLASM OF BREAST: Status: RESOLVED | Noted: 2023-06-22 | Resolved: 2025-07-17

## 2025-07-17 PROCEDURE — 99214 OFFICE O/P EST MOD 30 MIN: CPT

## 2025-07-17 PROCEDURE — G2211 COMPLEX E/M VISIT ADD ON: CPT

## 2025-07-17 RX ORDER — ANASTROZOLE TABLETS 1 MG/1
1 TABLET ORAL
Qty: 90 | Refills: 1 | Status: ACTIVE | COMMUNITY
Start: 2025-07-17 | End: 1900-01-01

## 2025-07-21 ENCOUNTER — APPOINTMENT (OUTPATIENT)
Dept: PLASTIC SURGERY | Facility: CLINIC | Age: 75
End: 2025-07-21
Payer: MEDICARE

## 2025-07-21 VITALS
DIASTOLIC BLOOD PRESSURE: 77 MMHG | WEIGHT: 138 LBS | BODY MASS INDEX: 27.09 KG/M2 | SYSTOLIC BLOOD PRESSURE: 145 MMHG | TEMPERATURE: 97.6 F | HEART RATE: 81 BPM | HEIGHT: 60 IN | OXYGEN SATURATION: 98 %

## 2025-07-21 DIAGNOSIS — C50.811 MALIGNANT NEOPLASM OF OVERLAPPING SITES OF RIGHT FEMALE BREAST: ICD-10-CM

## 2025-07-21 DIAGNOSIS — Z17.0 MALIGNANT NEOPLASM OF OVERLAPPING SITES OF RIGHT FEMALE BREAST: ICD-10-CM

## 2025-07-21 PROCEDURE — 99212 OFFICE O/P EST SF 10 MIN: CPT

## 2025-07-24 ENCOUNTER — APPOINTMENT (OUTPATIENT)
Dept: INTERNAL MEDICINE | Facility: CLINIC | Age: 75
End: 2025-07-24
Payer: MEDICARE

## 2025-07-24 VITALS
RESPIRATION RATE: 16 BRPM | HEIGHT: 60 IN | TEMPERATURE: 97.8 F | DIASTOLIC BLOOD PRESSURE: 84 MMHG | SYSTOLIC BLOOD PRESSURE: 126 MMHG | OXYGEN SATURATION: 97 % | BODY MASS INDEX: 26.7 KG/M2 | HEART RATE: 82 BPM | WEIGHT: 136 LBS

## 2025-07-24 DIAGNOSIS — E78.5 HYPERLIPIDEMIA, UNSPECIFIED: ICD-10-CM

## 2025-07-24 DIAGNOSIS — Z00.00 ENCOUNTER FOR GENERAL ADULT MEDICAL EXAMINATION W/OUT ABNORMAL FINDINGS: ICD-10-CM

## 2025-07-24 PROCEDURE — G0439: CPT

## 2025-07-24 PROCEDURE — 93000 ELECTROCARDIOGRAM COMPLETE: CPT

## 2025-07-25 LAB
25(OH)D3 SERPL-MCNC: 46.3 NG/ML
ALBUMIN SERPL ELPH-MCNC: 4.6 G/DL
ALP BLD-CCNC: 93 U/L
ALT SERPL-CCNC: 18 U/L
ANION GAP SERPL CALC-SCNC: 15 MMOL/L
APPEARANCE: CLEAR
AST SERPL-CCNC: 20 U/L
BACTERIA: NEGATIVE /HPF
BASOPHILS # BLD AUTO: 0.05 K/UL
BASOPHILS NFR BLD AUTO: 0.6 %
BILIRUB SERPL-MCNC: 0.6 MG/DL
BILIRUBIN URINE: NEGATIVE
BLOOD URINE: NEGATIVE
BUN SERPL-MCNC: 16 MG/DL
CALCIUM SERPL-MCNC: 10.5 MG/DL
CAST: 0 /LPF
CHLORIDE SERPL-SCNC: 105 MMOL/L
CHOLEST SERPL-MCNC: 231 MG/DL
CO2 SERPL-SCNC: 23 MMOL/L
COLOR: YELLOW
CREAT SERPL-MCNC: 0.85 MG/DL
EGFRCR SERPLBLD CKD-EPI 2021: 72 ML/MIN/1.73M2
EOSINOPHIL # BLD AUTO: 0.12 K/UL
EOSINOPHIL NFR BLD AUTO: 1.6 %
EPITHELIAL CELLS: 3 /HPF
ESTIMATED AVERAGE GLUCOSE: 111 MG/DL
FOLATE SERPL-MCNC: >20 NG/ML
GLUCOSE QUALITATIVE U: NEGATIVE MG/DL
GLUCOSE SERPL-MCNC: 83 MG/DL
HBA1C MFR BLD HPLC: 5.5 %
HCT VFR BLD CALC: 37.5 %
HDLC SERPL-MCNC: 93 MG/DL
HGB BLD-MCNC: 12.4 G/DL
IMM GRANULOCYTES NFR BLD AUTO: 0.3 %
KETONES URINE: ABNORMAL MG/DL
LDLC SERPL-MCNC: 128 MG/DL
LEUKOCYTE ESTERASE URINE: NEGATIVE
LYMPHOCYTES # BLD AUTO: 1.56 K/UL
LYMPHOCYTES NFR BLD AUTO: 20.2 %
MAN DIFF?: NORMAL
MCHC RBC-ENTMCNC: 32.2 PG
MCHC RBC-ENTMCNC: 33.1 G/DL
MCV RBC AUTO: 97.4 FL
MICROSCOPIC-UA: NORMAL
MONOCYTES # BLD AUTO: 0.62 K/UL
MONOCYTES NFR BLD AUTO: 8 %
NEUTROPHILS # BLD AUTO: 5.34 K/UL
NEUTROPHILS NFR BLD AUTO: 69.3 %
NITRITE URINE: NEGATIVE
NONHDLC SERPL-MCNC: 139 MG/DL
PH URINE: 5
PLATELET # BLD AUTO: 364 K/UL
POTASSIUM SERPL-SCNC: 4.4 MMOL/L
PROT SERPL-MCNC: 7.7 G/DL
PROTEIN URINE: NEGATIVE MG/DL
RBC # BLD: 3.85 M/UL
RBC # FLD: 13.3 %
RED BLOOD CELLS URINE: 1 /HPF
SODIUM SERPL-SCNC: 144 MMOL/L
SPECIFIC GRAVITY URINE: 1.02
TRIGL SERPL-MCNC: 65 MG/DL
TSH SERPL-ACNC: 2.99 UIU/ML
UROBILINOGEN URINE: 0.2 MG/DL
VIT B12 SERPL-MCNC: 672 PG/ML
WBC # FLD AUTO: 7.71 K/UL
WHITE BLOOD CELLS URINE: 2 /HPF

## 2025-09-08 ENCOUNTER — APPOINTMENT (OUTPATIENT)
Dept: ENDOCRINOLOGY | Facility: CLINIC | Age: 75
End: 2025-09-08
Payer: MEDICARE

## 2025-09-08 DIAGNOSIS — M81.0 AGE-RELATED OSTEOPOROSIS W/OUT CURRENT PATHOLOGICAL FRACTURE: ICD-10-CM

## 2025-09-08 PROCEDURE — 96372 THER/PROPH/DIAG INJ SC/IM: CPT

## 2025-09-08 RX ORDER — DENOSUMAB 60 MG/ML
60 INJECTION SUBCUTANEOUS
Qty: 1 | Refills: 0 | Status: COMPLETED | OUTPATIENT
Start: 2025-09-08

## 2025-09-08 RX ADMIN — DENOSUMAB 60 MG/ML: 60 INJECTION SUBCUTANEOUS at 00:00

## (undated) DEVICE — SAVI SCOUT HANDPIECE

## (undated) DEVICE — SUT PLAIN GUT FAST ABSORBING 5-0 PC-1

## (undated) DEVICE — VENODYNE/SCD SLEEVE CALF MEDIUM

## (undated) DEVICE — SOL IRR POUR NS 0.9% 500ML

## (undated) DEVICE — PACK MINOR

## (undated) DEVICE — NDL HYPO SAFE 22G X 1.5" (BLACK)

## (undated) DEVICE — DRSG TEGADERM + PAD 3.5X4"

## (undated) DEVICE — MEDICATION LABELS W MARKER

## (undated) DEVICE — DRAPE TOWEL BLUE 17" X 24"

## (undated) DEVICE — DRAIN JACKSON PRATT 10MM FLAT FULL 15FR TROCAR

## (undated) DEVICE — DRAPE THYROID 77" X 123"

## (undated) DEVICE — SUT SOFSILK 2-0 18" C-23

## (undated) DEVICE — WARMING BLANKET LOWER ADULT

## (undated) DEVICE — DRAPE INSTRUMENT POUCH 6.75" X 11"

## (undated) DEVICE — DRSG STERISTRIPS 0.5 X 4"

## (undated) DEVICE — SPECIMEN CONTAINER 100ML

## (undated) DEVICE — DRSG TELFA 3 X 8

## (undated) DEVICE — ELCTR BOVIE TIP BLADE INSULATED 2.75" EDGE

## (undated) DEVICE — DRAPE CHEST BREAST 106" X 122"

## (undated) DEVICE — SUT POLYSORB 3-0 30" V-20 UNDYED

## (undated) DEVICE — DRSG DERMABOND 0.7ML

## (undated) DEVICE — SOL IRR POUR H2O 250ML

## (undated) DEVICE — SUT POLYSORB 4-0 18" P-12 UNDYED

## (undated) DEVICE — PREP BETADINE KIT

## (undated) DEVICE — DRAIN RESERVOIR FOR JACKSON PRATT 100CC CARDINAL

## (undated) DEVICE — DRSG TEGADERM 1.75X1.75"

## (undated) DEVICE — SPONGE PEANUT AUTO COUNT

## (undated) DEVICE — LAP PAD 18 X 18"